# Patient Record
Sex: MALE | Race: WHITE | NOT HISPANIC OR LATINO | Employment: FULL TIME | URBAN - METROPOLITAN AREA
[De-identification: names, ages, dates, MRNs, and addresses within clinical notes are randomized per-mention and may not be internally consistent; named-entity substitution may affect disease eponyms.]

---

## 2017-01-17 RX ORDER — AMLODIPINE BESYLATE AND BENAZEPRIL HYDROCHLORIDE 5; 20 MG/1; MG/1
CAPSULE ORAL
Qty: 90 CAPSULE | Refills: 0 | Status: SHIPPED | OUTPATIENT
Start: 2017-01-17 | End: 2017-02-02 | Stop reason: SDUPTHER

## 2017-02-02 ENCOUNTER — OFFICE VISIT (OUTPATIENT)
Dept: SPORTS MEDICINE | Facility: CLINIC | Age: 52
End: 2017-02-02

## 2017-02-02 VITALS
HEIGHT: 76 IN | OXYGEN SATURATION: 98 % | BODY MASS INDEX: 37.51 KG/M2 | WEIGHT: 308 LBS | SYSTOLIC BLOOD PRESSURE: 110 MMHG | RESPIRATION RATE: 20 BRPM | HEART RATE: 77 BPM | DIASTOLIC BLOOD PRESSURE: 74 MMHG

## 2017-02-02 DIAGNOSIS — Z00.00 PREVENTATIVE HEALTH CARE: Primary | ICD-10-CM

## 2017-02-02 DIAGNOSIS — I10 ESSENTIAL HYPERTENSION: ICD-10-CM

## 2017-02-02 DIAGNOSIS — Z12.11 SCREEN FOR COLON CANCER: ICD-10-CM

## 2017-02-02 PROBLEM — I82.409 DEEP VEIN THROMBOSIS (HCC): Status: ACTIVE | Noted: 2017-02-02

## 2017-02-02 PROBLEM — M25.559 ARTHRALGIA OF HIP: Status: ACTIVE | Noted: 2017-02-02

## 2017-02-02 PROCEDURE — 99396 PREV VISIT EST AGE 40-64: CPT | Performed by: FAMILY MEDICINE

## 2017-02-02 RX ORDER — AMLODIPINE BESYLATE AND BENAZEPRIL HYDROCHLORIDE 5; 20 MG/1; MG/1
1 CAPSULE ORAL DAILY
Qty: 90 CAPSULE | Refills: 3 | Status: SHIPPED | OUTPATIENT
Start: 2017-02-02 | End: 2018-01-24 | Stop reason: SDUPTHER

## 2017-02-02 NOTE — PROGRESS NOTES
"Subjective   Kamran Gongora is a 51 y.o. male.   Chief Complaint   Patient presents with   • Annual Exam       History of Present Illness   1.  CPE  2.  No c/o with Lotrel      I have reviewed the patient's medical history in detail and updated the computerized patient record.      Review of Systems   Constitutional: Negative for activity change, appetite change, chills, diaphoresis, fatigue, fever and unexpected weight change.   HENT: Negative for congestion, ear pain, postnasal drip, rhinorrhea, sinus pressure, sneezing, sore throat and trouble swallowing.    Eyes: Negative for visual disturbance.   Respiratory: Negative for cough, chest tightness, shortness of breath and wheezing.    Cardiovascular: Negative for chest pain and palpitations.   Gastrointestinal: Negative for abdominal pain, blood in stool, nausea and vomiting.   Endocrine: Negative for cold intolerance, polydipsia, polyphagia and polyuria.   Genitourinary: Negative for dysuria, flank pain, frequency, hematuria and urgency.   Musculoskeletal: Negative for arthralgias, back pain, joint swelling and myalgias.   Skin: Negative for rash.   Allergic/Immunologic: Negative for environmental allergies.   Neurological: Negative for dizziness, syncope, weakness, numbness and headaches.   Hematological: Negative for adenopathy. Does not bruise/bleed easily.   Psychiatric/Behavioral: Negative for agitation, decreased concentration, dysphoric mood, sleep disturbance and suicidal ideas. The patient is not nervous/anxious.      Visit Vitals   • /74 (BP Location: Left arm, Patient Position: Sitting, Cuff Size: Large Adult)   • Pulse 77   • Resp 20   • Ht 75.5\" (191.8 cm)   • Wt (!) 308 lb (140 kg)   • SpO2 98%   • BMI 37.99 kg/m2       Objective   Physical Exam   Constitutional: He is oriented to person, place, and time. He appears well-developed and well-nourished.   HENT:   Head: Normocephalic and atraumatic.   Nose: Nose normal.   Mouth/Throat: " Oropharynx is clear and moist.   B/l hearing aids   Eyes: Conjunctivae and EOM are normal. Pupils are equal, round, and reactive to light.   Neck: Normal range of motion. Neck supple. No tracheal deviation present. No thyromegaly present.   Cardiovascular: Normal rate, regular rhythm and normal heart sounds.    No peripheral edema   Pulmonary/Chest: Effort normal and breath sounds normal.   Lymphadenopathy:     He has no cervical adenopathy.   Neurological: He is alert and oriented to person, place, and time.   Skin: Skin is warm, dry and intact.   Psychiatric: He has a normal mood and affect. His behavior is normal. Thought content normal.   Vitals reviewed.      Assessment/Plan   Kamran was seen today for annual exam.    Diagnoses and all orders for this visit:    Preventative health care  -     CBC & Differential  -     Comprehensive Metabolic Panel  -     Lipid Panel With / Chol / HDL Ratio  -     PSA  -     T4, Free  -     TSH  -     UA / M With / Rflx Culture([facility] ONLY)    Screen for colon cancer  -     Ambulatory Referral For Screening Colonoscopy    Essential hypertension  -     amLODIPine-benazepril (LOTREL 5-20) 5-20 MG per capsule; Take 1 capsule by mouth Daily.

## 2017-02-03 LAB
ALBUMIN SERPL-MCNC: 4.5 G/DL (ref 3.5–5.5)
ALBUMIN/GLOB SERPL: 1.7 {RATIO} (ref 1.1–2.5)
ALP SERPL-CCNC: 94 IU/L (ref 39–117)
ALT SERPL-CCNC: 19 IU/L (ref 0–44)
APPEARANCE UR: CLEAR
AST SERPL-CCNC: 20 IU/L (ref 0–40)
BACTERIA #/AREA URNS HPF: NORMAL /[HPF]
BASOPHILS # BLD AUTO: 0.1 X10E3/UL (ref 0–0.2)
BASOPHILS NFR BLD AUTO: 1 %
BILIRUB SERPL-MCNC: 0.8 MG/DL (ref 0–1.2)
BILIRUB UR QL STRIP: NEGATIVE
BUN SERPL-MCNC: 15 MG/DL (ref 6–24)
BUN/CREAT SERPL: 15 (ref 9–20)
CALCIUM SERPL-MCNC: 9.8 MG/DL (ref 8.7–10.2)
CHLORIDE SERPL-SCNC: 102 MMOL/L (ref 96–106)
CHOLEST SERPL-MCNC: 192 MG/DL (ref 100–199)
CHOLEST/HDLC SERPL: 3.4 RATIO UNITS (ref 0–5)
CO2 SERPL-SCNC: 24 MMOL/L (ref 18–29)
COLOR UR: YELLOW
CREAT SERPL-MCNC: 0.98 MG/DL (ref 0.76–1.27)
EOSINOPHIL # BLD AUTO: 0.2 X10E3/UL (ref 0–0.4)
EOSINOPHIL NFR BLD AUTO: 3 %
EPI CELLS #/AREA URNS HPF: NORMAL /HPF
ERYTHROCYTE [DISTWIDTH] IN BLOOD BY AUTOMATED COUNT: 13.3 % (ref 12.3–15.4)
GLOBULIN SER CALC-MCNC: 2.7 G/DL (ref 1.5–4.5)
GLUCOSE SERPL-MCNC: 87 MG/DL (ref 65–99)
GLUCOSE UR QL: NEGATIVE
HCT VFR BLD AUTO: 48.7 % (ref 37.5–51)
HDLC SERPL-MCNC: 56 MG/DL
HGB BLD-MCNC: 16.3 G/DL (ref 12.6–17.7)
HGB UR QL STRIP: NEGATIVE
IMM GRANULOCYTES # BLD: 0 X10E3/UL (ref 0–0.1)
IMM GRANULOCYTES NFR BLD: 0 %
KETONES UR QL STRIP: NEGATIVE
LDLC SERPL CALC-MCNC: 118 MG/DL (ref 0–99)
LEUKOCYTE ESTERASE UR QL STRIP: NEGATIVE
LYMPHOCYTES # BLD AUTO: 1.6 X10E3/UL (ref 0.7–3.1)
LYMPHOCYTES NFR BLD AUTO: 23 %
MCH RBC QN AUTO: 30.6 PG (ref 26.6–33)
MCHC RBC AUTO-ENTMCNC: 33.5 G/DL (ref 31.5–35.7)
MCV RBC AUTO: 91 FL (ref 79–97)
MICRO URNS: NORMAL
MICRO URNS: NORMAL
MONOCYTES # BLD AUTO: 0.8 X10E3/UL (ref 0.1–0.9)
MONOCYTES NFR BLD AUTO: 12 %
MUCOUS THREADS URNS QL MICRO: PRESENT
NEUTROPHILS # BLD AUTO: 4.3 X10E3/UL (ref 1.4–7)
NEUTROPHILS NFR BLD AUTO: 61 %
NITRITE UR QL STRIP: NEGATIVE
PH UR STRIP: 7 [PH] (ref 5–7.5)
PLATELET # BLD AUTO: 363 X10E3/UL (ref 150–379)
POTASSIUM SERPL-SCNC: 4.8 MMOL/L (ref 3.5–5.2)
PROT SERPL-MCNC: 7.2 G/DL (ref 6–8.5)
PROT UR QL STRIP: NEGATIVE
PSA SERPL-MCNC: 1.7 NG/ML (ref 0–4)
RBC # BLD AUTO: 5.33 X10E6/UL (ref 4.14–5.8)
RBC #/AREA URNS HPF: NORMAL /HPF
SODIUM SERPL-SCNC: 140 MMOL/L (ref 134–144)
SP GR UR: 1.01 (ref 1–1.03)
T4 FREE SERPL-MCNC: 1.03 NG/DL (ref 0.82–1.77)
TRIGL SERPL-MCNC: 92 MG/DL (ref 0–149)
TSH SERPL DL<=0.005 MIU/L-ACNC: 2.94 UIU/ML (ref 0.45–4.5)
URINALYSIS REFLEX: NORMAL
UROBILINOGEN UR STRIP-MCNC: 0.2 MG/DL (ref 0.2–1)
VLDLC SERPL CALC-MCNC: 18 MG/DL (ref 5–40)
WBC # BLD AUTO: 7 X10E3/UL (ref 3.4–10.8)
WBC #/AREA URNS HPF: NORMAL /HPF

## 2017-02-08 ENCOUNTER — TELEPHONE (OUTPATIENT)
Dept: SPORTS MEDICINE | Facility: CLINIC | Age: 52
End: 2017-02-08

## 2017-07-07 ENCOUNTER — OFFICE VISIT (OUTPATIENT)
Dept: SPORTS MEDICINE | Facility: CLINIC | Age: 52
End: 2017-07-07

## 2017-07-07 VITALS
OXYGEN SATURATION: 98 % | BODY MASS INDEX: 38.36 KG/M2 | HEART RATE: 87 BPM | SYSTOLIC BLOOD PRESSURE: 120 MMHG | HEIGHT: 76 IN | WEIGHT: 315 LBS | DIASTOLIC BLOOD PRESSURE: 76 MMHG

## 2017-07-07 DIAGNOSIS — M16.12 PRIMARY OSTEOARTHRITIS OF LEFT HIP: Primary | ICD-10-CM

## 2017-07-07 DIAGNOSIS — M70.62 TROCHANTERIC BURSITIS OF LEFT HIP: ICD-10-CM

## 2017-07-07 PROCEDURE — 20610 DRAIN/INJ JOINT/BURSA W/O US: CPT | Performed by: FAMILY MEDICINE

## 2017-07-07 PROCEDURE — 73502 X-RAY EXAM HIP UNI 2-3 VIEWS: CPT | Performed by: FAMILY MEDICINE

## 2017-07-07 PROCEDURE — 99214 OFFICE O/P EST MOD 30 MIN: CPT | Performed by: FAMILY MEDICINE

## 2017-07-07 RX ORDER — TRIAMCINOLONE ACETONIDE 40 MG/ML
40 INJECTION, SUSPENSION INTRA-ARTICULAR; INTRAMUSCULAR ONCE
Status: COMPLETED | OUTPATIENT
Start: 2017-07-07 | End: 2017-07-07

## 2017-07-07 RX ADMIN — TRIAMCINOLONE ACETONIDE 40 MG: 40 INJECTION, SUSPENSION INTRA-ARTICULAR; INTRAMUSCULAR at 16:42

## 2017-07-07 NOTE — PROGRESS NOTES
"Kamran is a 52 y.o. year old male    Chief Complaint   Patient presents with   • Left Hip - Pain       History of Present Illness  Patient has been having left hip stiffness for a number of years, has noted over the past few months that he has discomfort over the lateral and posterior hip after prolonged working hours were he is standing.  No known trauma.  Occasionally has some discomfort in the anterior hip.  No previous injuries, no locking or giving way.  No radicular symptoms.    I have reviewed the patient's medical history in detail and updated the computerized patient record.    Review of Systems   Constitutional: Negative for fever.   Musculoskeletal:        Per history of present illness   Skin: Negative for wound.   Neurological: Negative for numbness.   All other systems reviewed and are negative.      /76  Pulse 87  Ht 75.5\" (191.8 cm)  Wt (!) 317 lb (144 kg)  SpO2 98%  BMI 39.1 kg/m2     Physical Exam    Vital signs reviewed.   General: No acute distress.  Eyes: conjunctiva clear; pupils equally round and reactive  ENT: external ears and nose atraumatic; oropharynx clear  CV: no peripheral edema, 2+ distal pulses  Resp: normal respiratory effort, no use of accessory muscles  Skin: no rashes or wounds; normal turgor  Psych: mood and affect appropriate; recent and remote memory intact  Neuro: sensation to light touch intact    MSK Exam:  Left hip normal in general appearance, patient has tenderness over the superior posterior greater trochanter.  Patient has extremely limited internal rotation to only a few degrees.  Patient has only mildly limited external rotation.  Patient does have discomfort with attempt of internal rotation.  Negative logroll.  Motor 5 out of 5.    Left Hip X-Ray  Indication: Pain  AP and Frog Leg views    Findings:  No fracture  No bony lesion  Normal soft tissues  Moderate arthritic changes right femoral acetabulum, severe arthritis left FA joint.    No prior studies " "were available for comparison.    Trochanteric Bursa Injection Procedure Note    Left trochanteric bursa/gluteal tendon insertion injection was discussed with the patient in detail, including indication, risks, benefits, and alternatives. Verbal consent was given for the procedure. Injection was performed by MD.  Injection site was identified by physical examination and cleaned with Betadine and alcohol swabs. Prior to needle insertion, ethyl chloride spray was used for surface anesthesia. Sterile technique was used.  A 25-gauge, 1.5\" needle was directed to the bursa space by direct approach. Injectate was passed without difficulty. The needle was removed and a simple bandage was applied. The procedure was tolerated well without difficulty.    Injection mixture:  1% lidocaine without epinephrine: 3 mL    40 mg/mL triamcinolone acetonide: 1 mL    Diagnoses and all orders for this visit:    Primary osteoarthritis of left hip  -     XR Hip With or Without Pelvis 2 - 3 View Left  -     Ambulatory Referral to Orthopedic Surgery    Trochanteric bursitis of left hip  -     triamcinolone acetonide (KENALOG-40) injection 40 mg; Inject 1 mL into the joint 1 (One) Time.    In an attempt to alleviate his discomfort over the lateral hip today, which could be either from trochanteric bursitis or gluteus medius tendinitis, we will try injection of Kenalog 40 mg.  Postinjection ice and activity was discussed with the patient.  Patient has significant arthritis left hip, refer to orthopedics.  I have given him a copy of his x-rays to take with him to that appointment.    EMR Dragon/Transcription disclaimer:    Much of this encounter note is an electronic transcription/translation of spoken language to printed text.  The electronic translation of spoken language may permit erroneous, or at times, nonsensical words or phrases to be inadvertently transcribed.  Although I have reviewed the note for such errors some may still exist.     "

## 2017-11-03 ENCOUNTER — OFFICE VISIT (OUTPATIENT)
Dept: SPORTS MEDICINE | Facility: CLINIC | Age: 52
End: 2017-11-03

## 2017-11-03 VITALS
BODY MASS INDEX: 39.17 KG/M2 | OXYGEN SATURATION: 97 % | WEIGHT: 315 LBS | DIASTOLIC BLOOD PRESSURE: 84 MMHG | HEIGHT: 75 IN | SYSTOLIC BLOOD PRESSURE: 114 MMHG | HEART RATE: 82 BPM

## 2017-11-03 DIAGNOSIS — Z01.818 PREOPERATIVE CLEARANCE: Primary | ICD-10-CM

## 2017-11-03 DIAGNOSIS — M16.12 PRIMARY OSTEOARTHRITIS OF LEFT HIP: ICD-10-CM

## 2017-11-03 PROCEDURE — 99214 OFFICE O/P EST MOD 30 MIN: CPT | Performed by: FAMILY MEDICINE

## 2017-11-03 NOTE — PROGRESS NOTES
"Kamran is a 52 y.o. year old male    Chief Complaint   Patient presents with   • Medical Clearance       History of Present Illness   HPI   L hip replacement planned for 11/15/2017 Dr [email or web address].labs and EKG 10/25/2017. No CP, no SOA, no palpitations, syncope or near syncope. No cough, no fever or chills.  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 Treated without difficulty. No family history of clotting disorders. His labs on 10/25/2017 were normal, his EKG shows left axis deviation.    I have reviewed the patient's medical, family, and social history in detail and updated the computerized patient record.    Review of Systems   Constitutional: Negative for activity change, appetite change, chills, diaphoresis, fatigue, fever and unexpected weight change.   HENT: Negative for congestion, ear pain, postnasal drip, rhinorrhea, sinus pressure, sneezing, sore throat and trouble swallowing.    Eyes: Negative for visual disturbance.   Respiratory: Negative for cough, chest tightness, shortness of breath and wheezing.    Cardiovascular: Negative for chest pain and palpitations.   Gastrointestinal: Negative for abdominal pain, blood in stool, nausea and vomiting.   Endocrine: Negative for cold intolerance, polydipsia, polyphagia and polyuria.   Genitourinary: Negative for dysuria, flank pain, frequency, hematuria and urgency.   Musculoskeletal: Negative for arthralgias, back pain, joint swelling and myalgias.        L hip pain   Skin: Negative for rash.   Allergic/Immunologic: Negative for environmental allergies.   Neurological: Negative for dizziness, syncope, weakness, numbness and headaches.   Hematological: Negative for adenopathy. Does not bruise/bleed easily.   Psychiatric/Behavioral: Negative for agitation, decreased concentration, dysphoric mood, sleep disturbance and suicidal ideas. The patient is not nervous/anxious.        /84  Pulse 82  Ht 75\" (190.5 cm)  Wt (!) " 322 lb (146 kg)  SpO2 97%  BMI 40.25 kg/m2     Physical Exam   Constitutional: He is oriented to person, place, and time. He appears well-developed and well-nourished.   HENT:   Head: Normocephalic and atraumatic.   Eyes: Conjunctivae and EOM are normal. Pupils are equal, round, and reactive to light.   Neck: Normal range of motion. Neck supple. No thyromegaly present.   Cardiovascular: Normal rate, regular rhythm and normal heart sounds.    No peripheral edema   Pulmonary/Chest: Effort normal and breath sounds normal.   Neurological: He is alert and oriented to person, place, and time.   Skin: Skin is warm, dry and intact.   Psychiatric: He has a normal mood and affect. His behavior is normal. Thought content normal.   Vitals reviewed.       Diagnoses and all orders for this visit:    Preoperative clearance    Primary osteoarthritis of left hip    At this time I see no underlying medical issues that would preclude the patient from having his planned surgery.  It is possible that he would need a prolonged course of anti-thrombotic's after his surgery given his past history provoked DVT.    EMR Dragon/Transcription disclaimer:    Much of this encounter note is an electronic transcription/translation of spoken language to printed text.  The electronic translation of spoken language may permit erroneous, or at times, nonsensical words or phrases to be inadvertently transcribed.  Although I have reviewed the note for such errors some may still exist.

## 2018-01-24 DIAGNOSIS — I10 ESSENTIAL HYPERTENSION: ICD-10-CM

## 2018-01-24 RX ORDER — AMLODIPINE BESYLATE AND BENAZEPRIL HYDROCHLORIDE 5; 20 MG/1; MG/1
CAPSULE ORAL
Qty: 30 CAPSULE | Refills: 2 | Status: SHIPPED | OUTPATIENT
Start: 2018-01-24 | End: 2018-04-27 | Stop reason: SDUPTHER

## 2018-04-27 DIAGNOSIS — I10 ESSENTIAL HYPERTENSION: ICD-10-CM

## 2018-04-27 RX ORDER — AMLODIPINE BESYLATE AND BENAZEPRIL HYDROCHLORIDE 5; 20 MG/1; MG/1
CAPSULE ORAL
Qty: 30 CAPSULE | Refills: 1 | Status: SHIPPED | OUTPATIENT
Start: 2018-04-27 | End: 2018-07-01 | Stop reason: SDUPTHER

## 2018-07-01 DIAGNOSIS — I10 ESSENTIAL HYPERTENSION: ICD-10-CM

## 2018-07-02 RX ORDER — AMLODIPINE BESYLATE AND BENAZEPRIL HYDROCHLORIDE 5; 20 MG/1; MG/1
CAPSULE ORAL
Qty: 30 CAPSULE | Refills: 0 | Status: SHIPPED | OUTPATIENT
Start: 2018-07-02 | End: 2018-08-01 | Stop reason: SDUPTHER

## 2018-08-01 DIAGNOSIS — I10 ESSENTIAL HYPERTENSION: ICD-10-CM

## 2018-08-01 RX ORDER — AMLODIPINE BESYLATE AND BENAZEPRIL HYDROCHLORIDE 5; 20 MG/1; MG/1
CAPSULE ORAL
Qty: 30 CAPSULE | Refills: 0 | Status: SHIPPED | OUTPATIENT
Start: 2018-08-01 | End: 2018-08-31 | Stop reason: SDUPTHER

## 2018-08-31 DIAGNOSIS — I10 ESSENTIAL HYPERTENSION: ICD-10-CM

## 2018-08-31 RX ORDER — AMLODIPINE BESYLATE AND BENAZEPRIL HYDROCHLORIDE 5; 20 MG/1; MG/1
CAPSULE ORAL
Qty: 30 CAPSULE | Refills: 0 | Status: SHIPPED | OUTPATIENT
Start: 2018-08-31 | End: 2018-10-02 | Stop reason: SDUPTHER

## 2018-10-02 DIAGNOSIS — I10 ESSENTIAL HYPERTENSION: ICD-10-CM

## 2018-10-02 RX ORDER — AMLODIPINE BESYLATE AND BENAZEPRIL HYDROCHLORIDE 5; 20 MG/1; MG/1
CAPSULE ORAL
Qty: 30 CAPSULE | Refills: 0 | Status: SHIPPED | OUTPATIENT
Start: 2018-10-02 | End: 2018-11-01 | Stop reason: SDUPTHER

## 2018-11-01 DIAGNOSIS — I10 ESSENTIAL HYPERTENSION: ICD-10-CM

## 2018-11-01 RX ORDER — AMLODIPINE BESYLATE AND BENAZEPRIL HYDROCHLORIDE 5; 20 MG/1; MG/1
CAPSULE ORAL
Qty: 30 CAPSULE | Refills: 0 | Status: SHIPPED | OUTPATIENT
Start: 2018-11-01 | End: 2018-12-04 | Stop reason: SDUPTHER

## 2018-11-29 DIAGNOSIS — I10 ESSENTIAL HYPERTENSION: ICD-10-CM

## 2018-11-29 RX ORDER — AMLODIPINE BESYLATE AND BENAZEPRIL HYDROCHLORIDE 5; 20 MG/1; MG/1
CAPSULE ORAL
Qty: 30 CAPSULE | Refills: 0 | OUTPATIENT
Start: 2018-11-29

## 2018-12-04 ENCOUNTER — OFFICE VISIT (OUTPATIENT)
Dept: SPORTS MEDICINE | Facility: CLINIC | Age: 53
End: 2018-12-04

## 2018-12-04 VITALS
TEMPERATURE: 97.8 F | HEIGHT: 75 IN | WEIGHT: 315 LBS | DIASTOLIC BLOOD PRESSURE: 80 MMHG | HEART RATE: 91 BPM | SYSTOLIC BLOOD PRESSURE: 120 MMHG | BODY MASS INDEX: 39.17 KG/M2 | OXYGEN SATURATION: 98 %

## 2018-12-04 DIAGNOSIS — Z12.5 SCREENING FOR PROSTATE CANCER: ICD-10-CM

## 2018-12-04 DIAGNOSIS — Z12.11 SCREEN FOR COLON CANCER: ICD-10-CM

## 2018-12-04 DIAGNOSIS — I10 ESSENTIAL HYPERTENSION: ICD-10-CM

## 2018-12-04 DIAGNOSIS — Z00.00 PREVENTATIVE HEALTH CARE: Primary | ICD-10-CM

## 2018-12-04 PROBLEM — Z96.642 S/P HIP REPLACEMENT, LEFT: Status: ACTIVE | Noted: 2018-12-04

## 2018-12-04 PROBLEM — M16.12 ARTHRITIS OF LEFT HIP: Status: ACTIVE | Noted: 2017-09-27

## 2018-12-04 PROCEDURE — 99396 PREV VISIT EST AGE 40-64: CPT | Performed by: FAMILY MEDICINE

## 2018-12-04 RX ORDER — AMLODIPINE BESYLATE AND BENAZEPRIL HYDROCHLORIDE 5; 20 MG/1; MG/1
1 CAPSULE ORAL DAILY
Qty: 90 CAPSULE | Refills: 3 | Status: SHIPPED | OUTPATIENT
Start: 2018-12-04 | End: 2019-11-15 | Stop reason: SDUPTHER

## 2018-12-04 NOTE — PROGRESS NOTES
"Kamran is a 53 y.o. year old male    Chief Complaint   Patient presents with   • Med Refill     Patient also here for complete physical.  History of Present Illness   HPI   1.  Patient overall is doing well, he had previous left hip arthroplasty last year and is doing well.  2.  Follow-up hypertension, no chest pain, shortness of breath, syncope or near-syncope.  No complaints with current medication.  I have reviewed the patient's medical, family, and social history in detail and updated the computerized patient record.    Review of Systems   Constitutional: Negative.    HENT: Negative.    Respiratory: Negative.    Cardiovascular: Negative.    Gastrointestinal: Negative.    Endocrine: Negative.    Genitourinary: Negative.    Neurological: Negative.        /80   Pulse 91   Temp 97.8 °F (36.6 °C)   Ht 190.5 cm (75\")   Wt (!) 155 kg (342 lb)   SpO2 98%   BMI 42.75 kg/m²      Physical Exam   Constitutional: He is oriented to person, place, and time. He appears well-developed and well-nourished.   HENT:   Head: Normocephalic and atraumatic.   Bilateral hearing aids in place   Eyes: Conjunctivae and EOM are normal. Pupils are equal, round, and reactive to light.   Neck: Normal range of motion. Neck supple. No thyromegaly present.   Cardiovascular: Normal rate, regular rhythm and normal heart sounds.   No peripheral edema   Pulmonary/Chest: Effort normal and breath sounds normal.   Lymphadenopathy:     He has no cervical adenopathy.   Neurological: He is alert and oriented to person, place, and time.   Skin: Skin is warm, dry and intact.   Psychiatric: He has a normal mood and affect. His behavior is normal. Thought content normal.   Vitals reviewed.       Diagnoses and all orders for this visit:    Preventative health care  -     CBC & Differential  -     Comprehensive Metabolic Panel  -     Lipid Panel With / Chol / HDL Ratio  -     T4, Free  -     TSH  -     UA / M With / Rflx Culture([facility] ONLY) - Urine, " Clean Catch  -     Hemoglobin A1c  -     PSA Screen    Essential hypertension  -     CBC & Differential  -     Comprehensive Metabolic Panel  -     Lipid Panel With / Chol / HDL Ratio  -     T4, Free  -     TSH  -     UA / M With / Rflx Culture([facility] ONLY) - Urine, Clean Catch  -     Hemoglobin A1c  -     PSA Screen  -     amLODIPine-benazepril (LOTREL 5-20) 5-20 MG per capsule; Take 1 capsule by mouth Daily.    Screening for prostate cancer  -     CBC & Differential  -     Comprehensive Metabolic Panel  -     Lipid Panel With / Chol / HDL Ratio  -     T4, Free  -     TSH  -     UA / M With / Rflx Culture([facility] ONLY) - Urine, Clean Catch  -     Hemoglobin A1c  -     PSA Screen    Screen for colon cancer  -     Ambulatory Referral to Gastroenterology       Encourage healthy diet and exercise.  Encourage patient to stay up to date on screening examinations as indicated based on age and risk factors.        EMR Dragon/Transcription disclaimer:    Much of this encounter note is an electronic transcription/translation of spoken language to printed text.  The electronic translation of spoken language may permit erroneous, or at times, nonsensical words or phrases to be inadvertently transcribed.  Although I have reviewed the note for such errors some may still exist.

## 2018-12-05 LAB
ALBUMIN SERPL-MCNC: 4.3 G/DL (ref 3.5–5.2)
ALBUMIN/GLOB SERPL: 1.5 G/DL
ALP SERPL-CCNC: 97 U/L (ref 39–117)
ALT SERPL-CCNC: 20 U/L (ref 1–41)
APPEARANCE UR: CLEAR
AST SERPL-CCNC: 21 U/L (ref 1–40)
BACTERIA #/AREA URNS HPF: NORMAL /HPF
BASOPHILS # BLD AUTO: 0.05 10*3/MM3 (ref 0–0.2)
BASOPHILS NFR BLD AUTO: 0.4 % (ref 0–1.5)
BILIRUB SERPL-MCNC: 0.6 MG/DL (ref 0.1–1.2)
BILIRUB UR QL STRIP: NEGATIVE
BUN SERPL-MCNC: 15 MG/DL (ref 6–20)
BUN/CREAT SERPL: 14.2 (ref 7–25)
CALCIUM SERPL-MCNC: 9.9 MG/DL (ref 8.6–10.5)
CHLORIDE SERPL-SCNC: 100 MMOL/L (ref 98–107)
CHOLEST SERPL-MCNC: 191 MG/DL (ref 0–200)
CHOLEST/HDLC SERPL: 3.9 {RATIO}
CO2 SERPL-SCNC: 23.3 MMOL/L (ref 22–29)
COLOR UR: YELLOW
CREAT SERPL-MCNC: 1.06 MG/DL (ref 0.76–1.27)
EOSINOPHIL # BLD AUTO: 0.05 10*3/MM3 (ref 0–0.7)
EOSINOPHIL NFR BLD AUTO: 0.4 % (ref 0.3–6.2)
EPI CELLS #/AREA URNS HPF: NORMAL /HPF
ERYTHROCYTE [DISTWIDTH] IN BLOOD BY AUTOMATED COUNT: 13.4 % (ref 11.5–14.5)
GLOBULIN SER CALC-MCNC: 2.8 GM/DL
GLUCOSE SERPL-MCNC: 91 MG/DL (ref 65–99)
GLUCOSE UR QL: NEGATIVE
HBA1C MFR BLD: 5.55 % (ref 4.8–5.6)
HCT VFR BLD AUTO: 48.5 % (ref 40.4–52.2)
HDLC SERPL-MCNC: 49 MG/DL (ref 40–60)
HGB BLD-MCNC: 16.1 G/DL (ref 13.7–17.6)
HGB UR QL STRIP: NEGATIVE
IMM GRANULOCYTES # BLD: 0.03 10*3/MM3 (ref 0–0.03)
IMM GRANULOCYTES NFR BLD: 0.3 % (ref 0–0.5)
KETONES UR QL STRIP: NEGATIVE
LDLC SERPL CALC-MCNC: 120 MG/DL (ref 0–100)
LEUKOCYTE ESTERASE UR QL STRIP: NEGATIVE
LYMPHOCYTES # BLD AUTO: 2.1 10*3/MM3 (ref 0.9–4.8)
LYMPHOCYTES NFR BLD AUTO: 18 % (ref 19.6–45.3)
MCH RBC QN AUTO: 30.6 PG (ref 27–32.7)
MCHC RBC AUTO-ENTMCNC: 33.2 G/DL (ref 32.6–36.4)
MCV RBC AUTO: 92.2 FL (ref 79.8–96.2)
MICRO URNS: NORMAL
MICRO URNS: NORMAL
MONOCYTES # BLD AUTO: 0.81 10*3/MM3 (ref 0.2–1.2)
MONOCYTES NFR BLD AUTO: 7 % (ref 5–12)
MUCOUS THREADS URNS QL MICRO: PRESENT /HPF
NEUTROPHILS # BLD AUTO: 8.64 10*3/MM3 (ref 1.9–8.1)
NEUTROPHILS NFR BLD AUTO: 74.2 % (ref 42.7–76)
NITRITE UR QL STRIP: NEGATIVE
PH UR STRIP: 6.5 [PH] (ref 5–7.5)
PLATELET # BLD AUTO: 317 10*3/MM3 (ref 140–500)
POTASSIUM SERPL-SCNC: 4.7 MMOL/L (ref 3.5–5.2)
PROT SERPL-MCNC: 7.1 G/DL (ref 6–8.5)
PROT UR QL STRIP: NEGATIVE
PSA SERPL-MCNC: 1.77 NG/ML (ref 0–4)
RBC # BLD AUTO: 5.26 10*6/MM3 (ref 4.6–6)
RBC #/AREA URNS HPF: NORMAL /HPF
SODIUM SERPL-SCNC: 138 MMOL/L (ref 136–145)
SP GR UR: 1.01 (ref 1–1.03)
T4 FREE SERPL-MCNC: 0.99 NG/DL (ref 0.93–1.7)
TRIGL SERPL-MCNC: 110 MG/DL (ref 0–150)
TSH SERPL DL<=0.005 MIU/L-ACNC: 2.23 MIU/ML (ref 0.27–4.2)
URINALYSIS REFLEX: NORMAL
UROBILINOGEN UR STRIP-MCNC: 0.2 MG/DL (ref 0.2–1)
VLDLC SERPL CALC-MCNC: 22 MG/DL (ref 5–40)
WBC # BLD AUTO: 11.65 10*3/MM3 (ref 4.5–10.7)
WBC #/AREA URNS HPF: NORMAL /HPF

## 2019-11-15 DIAGNOSIS — I10 ESSENTIAL HYPERTENSION: ICD-10-CM

## 2019-11-19 RX ORDER — AMLODIPINE BESYLATE AND BENAZEPRIL HYDROCHLORIDE 5; 20 MG/1; MG/1
1 CAPSULE ORAL DAILY
Qty: 90 CAPSULE | Refills: 3 | Status: SHIPPED | OUTPATIENT
Start: 2019-11-19 | End: 2020-03-04 | Stop reason: SDUPTHER

## 2020-03-04 ENCOUNTER — OFFICE VISIT (OUTPATIENT)
Dept: SPORTS MEDICINE | Facility: CLINIC | Age: 55
End: 2020-03-04

## 2020-03-04 VITALS
BODY MASS INDEX: 39.17 KG/M2 | HEART RATE: 100 BPM | TEMPERATURE: 97.9 F | SYSTOLIC BLOOD PRESSURE: 124 MMHG | DIASTOLIC BLOOD PRESSURE: 82 MMHG | HEIGHT: 75 IN | OXYGEN SATURATION: 98 % | WEIGHT: 315 LBS

## 2020-03-04 DIAGNOSIS — I10 ESSENTIAL HYPERTENSION: ICD-10-CM

## 2020-03-04 DIAGNOSIS — Z00.00 PREVENTATIVE HEALTH CARE: Primary | ICD-10-CM

## 2020-03-04 LAB
ALBUMIN SERPL-MCNC: 4.6 G/DL (ref 3.5–5.2)
ALBUMIN/GLOB SERPL: 1.7 G/DL
ALP SERPL-CCNC: 94 U/L (ref 39–117)
ALT SERPL-CCNC: 19 U/L (ref 1–41)
AST SERPL-CCNC: 16 U/L (ref 1–40)
BASOPHILS # BLD AUTO: 0.07 10*3/MM3 (ref 0–0.2)
BASOPHILS NFR BLD AUTO: 1.1 % (ref 0–1.5)
BILIRUB SERPL-MCNC: 0.6 MG/DL (ref 0.2–1.2)
BUN SERPL-MCNC: 13 MG/DL (ref 6–20)
BUN/CREAT SERPL: 12.1 (ref 7–25)
CALCIUM SERPL-MCNC: 9.8 MG/DL (ref 8.6–10.5)
CHLORIDE SERPL-SCNC: 99 MMOL/L (ref 98–107)
CHOLEST SERPL-MCNC: 153 MG/DL (ref 0–200)
CHOLEST/HDLC SERPL: 3.56 {RATIO}
CO2 SERPL-SCNC: 24.4 MMOL/L (ref 22–29)
CREAT SERPL-MCNC: 1.07 MG/DL (ref 0.76–1.27)
EOSINOPHIL # BLD AUTO: 0.17 10*3/MM3 (ref 0–0.4)
EOSINOPHIL NFR BLD AUTO: 2.7 % (ref 0.3–6.2)
ERYTHROCYTE [DISTWIDTH] IN BLOOD BY AUTOMATED COUNT: 12.5 % (ref 12.3–15.4)
GLOBULIN SER CALC-MCNC: 2.7 GM/DL
GLUCOSE SERPL-MCNC: 100 MG/DL (ref 65–99)
HBA1C MFR BLD: 5.7 % (ref 4.8–5.6)
HCT VFR BLD AUTO: 48.6 % (ref 37.5–51)
HDLC SERPL-MCNC: 43 MG/DL (ref 40–60)
HGB BLD-MCNC: 16.6 G/DL (ref 13–17.7)
IMM GRANULOCYTES # BLD AUTO: 0.01 10*3/MM3 (ref 0–0.05)
IMM GRANULOCYTES NFR BLD AUTO: 0.2 % (ref 0–0.5)
LDLC SERPL CALC-MCNC: 90 MG/DL (ref 0–100)
LYMPHOCYTES # BLD AUTO: 1.52 10*3/MM3 (ref 0.7–3.1)
LYMPHOCYTES NFR BLD AUTO: 23.9 % (ref 19.6–45.3)
MCH RBC QN AUTO: 30.9 PG (ref 26.6–33)
MCHC RBC AUTO-ENTMCNC: 34.2 G/DL (ref 31.5–35.7)
MCV RBC AUTO: 90.3 FL (ref 79–97)
MONOCYTES # BLD AUTO: 0.63 10*3/MM3 (ref 0.1–0.9)
MONOCYTES NFR BLD AUTO: 9.9 % (ref 5–12)
NEUTROPHILS # BLD AUTO: 3.96 10*3/MM3 (ref 1.7–7)
NEUTROPHILS NFR BLD AUTO: 62.2 % (ref 42.7–76)
NRBC BLD AUTO-RTO: 0 /100 WBC (ref 0–0.2)
PLATELET # BLD AUTO: 343 10*3/MM3 (ref 140–450)
POTASSIUM SERPL-SCNC: 4.9 MMOL/L (ref 3.5–5.2)
PROT SERPL-MCNC: 7.3 G/DL (ref 6–8.5)
PSA SERPL-MCNC: 1.91 NG/ML (ref 0–4)
RBC # BLD AUTO: 5.38 10*6/MM3 (ref 4.14–5.8)
SODIUM SERPL-SCNC: 135 MMOL/L (ref 136–145)
T4 FREE SERPL-MCNC: 1.16 NG/DL (ref 0.93–1.7)
TRIGL SERPL-MCNC: 101 MG/DL (ref 0–150)
TSH SERPL DL<=0.005 MIU/L-ACNC: 2.95 UIU/ML (ref 0.27–4.2)
UNABLE TO VOID: NORMAL
VLDLC SERPL CALC-MCNC: 20.2 MG/DL
WBC # BLD AUTO: 6.36 10*3/MM3 (ref 3.4–10.8)

## 2020-03-04 PROCEDURE — 99396 PREV VISIT EST AGE 40-64: CPT | Performed by: FAMILY MEDICINE

## 2020-03-04 RX ORDER — AMLODIPINE BESYLATE AND BENAZEPRIL HYDROCHLORIDE 5; 20 MG/1; MG/1
1 CAPSULE ORAL DAILY
Qty: 90 CAPSULE | Refills: 3 | Status: SHIPPED | OUTPATIENT
Start: 2020-03-04 | End: 2021-02-22

## 2020-03-04 NOTE — PROGRESS NOTES
Kamran Gongora is here today for an annual physical exam.     Weight down with effort.     PHQ-2 Depression Screening  Little interest or pleasure in doing things? 0   Feeling down, depressed, or hopeless? 0   PHQ-2 Total Score 0        I have reviewed the patient's medical, family, and social history in detail and updated the computerized patient record.    Screening history:  Colonoscopy - due, he has the paperwork at home and will schedule this  Prostate - 2018  Metabolic - last year    Health Maintenance   Topic Date Due   • TDAP/TD VACCINES (1 - Tdap) 03/29/1977   • PNEUMOCOCCAL VACCINE (19-64 MEDIUM RISK) (1 of 1 - PPSV23) 03/29/1985   • ZOSTER VACCINE (1 of 2) 03/29/2016   • COLONOSCOPY  07/07/2017   • INFLUENZA VACCINE  08/01/2019   • ANNUAL PHYSICAL  12/05/2019       Review of Systems   Constitutional: Negative for activity change, appetite change, chills, diaphoresis, fatigue, fever and unexpected weight change.   HENT: Negative for congestion, ear pain, postnasal drip, rhinorrhea, sinus pressure, sneezing, sore throat and trouble swallowing.    Eyes: Negative for visual disturbance.   Respiratory: Negative for cough, chest tightness, shortness of breath and wheezing.    Cardiovascular: Negative for chest pain and palpitations.   Gastrointestinal: Negative for abdominal pain, blood in stool, nausea and vomiting.   Endocrine: Negative for cold intolerance, polydipsia, polyphagia and polyuria.   Genitourinary: Negative for dysuria, flank pain, frequency, hematuria and urgency.   Musculoskeletal: Negative for arthralgias, back pain, joint swelling and myalgias.   Skin: Negative for rash.   Allergic/Immunologic: Negative for environmental allergies.   Neurological: Negative for dizziness, syncope, weakness, numbness and headaches.   Hematological: Negative for adenopathy. Does not bruise/bleed easily.   Psychiatric/Behavioral: Negative for agitation, decreased concentration, dysphoric mood, sleep  "disturbance and suicidal ideas. The patient is not nervous/anxious.        /82   Pulse 100   Temp 97.9 °F (36.6 °C)   Ht 190.5 cm (75\")   Wt (!) 146 kg (322 lb)   SpO2 98%   BMI 40.25 kg/m²      Physical Exam    Vital signs reviewed.  General appearance: No acute distress  Eyes: conjunctiva clear without erythema; pupils equally round and reactive  ENT: external ears and nose normal; hearing normal, oropharynx clear  Neck: supple; no thyromegaly  CV: normal rate and rhythm; no peripheral edema  Respiratory: normal respiratory effort; lungs clear to auscultation bilaterally  MSK: normal gait and station; no focal joint deformity or swelling  Skin: no rash or wounds; normal turgor  Neuro: cranial nerves 2-12 grossly intact; normal sensation to light touch  Psych: mood and affect normal; recent and remote memory intact    No visits with results within 2 Week(s) from this visit.   Latest known visit with results is:   Office Visit on 12/04/2018   Component Date Value Ref Range Status   • WBC 12/04/2018 11.65* 4.50 - 10.70 10*3/mm3 Final   • RBC 12/04/2018 5.26  4.60 - 6.00 10*6/mm3 Final   • Hemoglobin 12/04/2018 16.1  13.7 - 17.6 g/dL Final   • Hematocrit 12/04/2018 48.5  40.4 - 52.2 % Final   • MCV 12/04/2018 92.2  79.8 - 96.2 fL Final   • MCH 12/04/2018 30.6  27.0 - 32.7 pg Final   • MCHC 12/04/2018 33.2  32.6 - 36.4 g/dL Final   • RDW 12/04/2018 13.4  11.5 - 14.5 % Final   • Platelets 12/04/2018 317  140 - 500 10*3/mm3 Final   • Neutrophil Rel % 12/04/2018 74.2  42.7 - 76.0 % Final   • Lymphocyte Rel % 12/04/2018 18.0* 19.6 - 45.3 % Final   • Monocyte Rel % 12/04/2018 7.0  5.0 - 12.0 % Final   • Eosinophil Rel % 12/04/2018 0.4  0.3 - 6.2 % Final   • Basophil Rel % 12/04/2018 0.4  0.0 - 1.5 % Final   • Neutrophils Absolute 12/04/2018 8.64* 1.90 - 8.10 10*3/mm3 Final   • Lymphocytes Absolute 12/04/2018 2.10  0.90 - 4.80 10*3/mm3 Final   • Monocytes Absolute 12/04/2018 0.81  0.20 - 1.20 10*3/mm3 Final   • " Eosinophils Absolute 12/04/2018 0.05  0.00 - 0.70 10*3/mm3 Final   • Basophils Absolute 12/04/2018 0.05  0.00 - 0.20 10*3/mm3 Final   • Immature Granulocyte Rel % 12/04/2018 0.3  0.0 - 0.5 % Final   • Immature Grans Absolute 12/04/2018 0.03  0.00 - 0.03 10*3/mm3 Final   • Glucose 12/04/2018 91  65 - 99 mg/dL Final   • BUN 12/04/2018 15  6 - 20 mg/dL Final   • Creatinine 12/04/2018 1.06  0.76 - 1.27 mg/dL Final   • eGFR Non  Am 12/04/2018 73  >60 mL/min/1.73 Final   • eGFR African Am 12/04/2018 89  >60 mL/min/1.73 Final   • BUN/Creatinine Ratio 12/04/2018 14.2  7.0 - 25.0 Final   • Sodium 12/04/2018 138  136 - 145 mmol/L Final   • Potassium 12/04/2018 4.7  3.5 - 5.2 mmol/L Final   • Chloride 12/04/2018 100  98 - 107 mmol/L Final   • Total CO2 12/04/2018 23.3  22.0 - 29.0 mmol/L Final   • Calcium 12/04/2018 9.9  8.6 - 10.5 mg/dL Final   • Total Protein 12/04/2018 7.1  6.0 - 8.5 g/dL Final   • Albumin 12/04/2018 4.30  3.50 - 5.20 g/dL Final   • Globulin 12/04/2018 2.8  gm/dL Final   • A/G Ratio 12/04/2018 1.5  g/dL Final   • Total Bilirubin 12/04/2018 0.6  0.1 - 1.2 mg/dL Final   • Alkaline Phosphatase 12/04/2018 97  39 - 117 U/L Final   • AST (SGOT) 12/04/2018 21  1 - 40 U/L Final   • ALT (SGPT) 12/04/2018 20  1 - 41 U/L Final   • Total Cholesterol 12/04/2018 191  0 - 200 mg/dL Final   • Triglycerides 12/04/2018 110  0 - 150 mg/dL Final   • HDL Cholesterol 12/04/2018 49  40 - 60 mg/dL Final   • VLDL Cholesterol 12/04/2018 22  5 - 40 mg/dL Final   • LDL Cholesterol  12/04/2018 120* 0 - 100 mg/dL Final   • Chol/HDL Ratio 12/04/2018 3.90   Final   • Free T4 12/04/2018 0.99  0.93 - 1.70 ng/dL Final   • TSH 12/04/2018 2.230  0.270 - 4.200 mIU/mL Final   • Specific Gravity, UA 12/04/2018 1.011  1.005 - 1.030 Final   • pH, UA 12/04/2018 6.5  5.0 - 7.5 Final   • Color, UA 12/04/2018 Yellow  Yellow Final   • Appearance, UA 12/04/2018 Clear  Clear Final   • Leukocytes, UA 12/04/2018 Negative  Negative Final   • Protein  12/04/2018 Negative  Negative/Trace Final   • Glucose, UA 12/04/2018 Negative  Negative Final   • Ketones 12/04/2018 Negative  Negative Final   • Blood, UA 12/04/2018 Negative  Negative Final   • Bilirubin, UA 12/04/2018 Negative  Negative Final   • Urobilinogen, UA 12/04/2018 0.2  0.2 - 1.0 mg/dL Final   • Nitrite, UA 12/04/2018 Negative  Negative Final   • Microscopic Examination 12/04/2018 Comment   Final    Microscopic follows if indicated.   • Microscopic Examination 12/04/2018 See below:   Final    Microscopic was indicated and was performed.   • Urinalysis Reflex 12/04/2018 Comment   Final    This specimen will not reflex to a Urine Culture.   • Hemoglobin A1C 12/04/2018 5.55  4.80 - 5.60 % Final    Comment: Hemoglobin A1C Ranges:  Increased Risk for Diabetes  5.7% to 6.4%  Diabetes                     >= 6.5%  Diabetic Goal                < 7.0%     • PSA 12/04/2018 1.770  0.000 - 4.000 ng/mL Final   • WBC, UA 12/04/2018 0-5  0 - 5 /hpf Final   • RBC, UA 12/04/2018 None seen  0 - 2 /hpf Final   • Epithelial Cells (non renal) 12/04/2018 0-10  0 - 10 /hpf Final   • Mucus, UA 12/04/2018 Present  Not Estab. /hpf Final   • Bacteria, UA 12/04/2018 None seen  None seen/Few /hpf Final          Current Outpatient Medications:   •  amLODIPine-benazepril (LOTREL 5-20) 5-20 MG per capsule, Take 1 capsule by mouth Daily., Disp: 90 capsule, Rfl: 3  •  ciclopirox (LOPROX) 0.77 % cream, , Disp: , Rfl:     Kamran was seen today for annual exam.    Diagnoses and all orders for this visit:    Preventative health care  -     Hemoglobin A1c  -     CBC & Differential  -     Comprehensive Metabolic Panel  -     Lipid Panel With / Chol / HDL Ratio  -     TSH  -     T4, Free  -     PSA Screen  -     Urinalysis With Microscopic - Urine, Clean Catch    Essential hypertension  -     amLODIPine-benazepril (LOTREL 5-20) 5-20 MG per capsule; Take 1 capsule by mouth Daily.        Encourage healthy diet and exercise.  Encourage patient to  stay up to date on screening examinations as indicated based on age and risk factors.    EMR Dragon/Transcription disclaimer:    Much of this encounter note is an electronic transcription/translation of spoken language to printed text.  The electronic translation of spoken language may permit erroneous, or at times, nonsensical words or phrases to be inadvertently transcribed.  Although I have reviewed the note for such errors some may still exist.

## 2021-02-20 DIAGNOSIS — I10 ESSENTIAL HYPERTENSION: ICD-10-CM

## 2021-02-22 RX ORDER — AMLODIPINE BESYLATE AND BENAZEPRIL HYDROCHLORIDE 5; 20 MG/1; MG/1
CAPSULE ORAL
Qty: 90 CAPSULE | Refills: 3 | Status: SHIPPED | OUTPATIENT
Start: 2021-02-22 | End: 2022-03-07 | Stop reason: SDUPTHER

## 2021-03-05 ENCOUNTER — OFFICE VISIT (OUTPATIENT)
Dept: SPORTS MEDICINE | Facility: CLINIC | Age: 56
End: 2021-03-05

## 2021-03-05 VITALS
WEIGHT: 279 LBS | OXYGEN SATURATION: 99 % | DIASTOLIC BLOOD PRESSURE: 82 MMHG | BODY MASS INDEX: 34.69 KG/M2 | TEMPERATURE: 97.9 F | HEIGHT: 75 IN | RESPIRATION RATE: 14 BRPM | HEART RATE: 85 BPM | SYSTOLIC BLOOD PRESSURE: 113 MMHG

## 2021-03-05 DIAGNOSIS — Z12.11 SCREEN FOR COLON CANCER: ICD-10-CM

## 2021-03-05 DIAGNOSIS — I10 ESSENTIAL HYPERTENSION: Chronic | ICD-10-CM

## 2021-03-05 DIAGNOSIS — Z00.00 PREVENTATIVE HEALTH CARE: Primary | ICD-10-CM

## 2021-03-05 DIAGNOSIS — Z11.59 NEED FOR HEPATITIS C SCREENING TEST: ICD-10-CM

## 2021-03-05 DIAGNOSIS — Z12.5 SCREENING FOR PROSTATE CANCER: ICD-10-CM

## 2021-03-05 PROBLEM — Z96.642 S/P HIP REPLACEMENT, LEFT: Chronic | Status: ACTIVE | Noted: 2018-12-04

## 2021-03-05 PROCEDURE — 99396 PREV VISIT EST AGE 40-64: CPT | Performed by: FAMILY MEDICINE

## 2021-03-05 NOTE — PROGRESS NOTES
"Kamran Gongora is here today for an annual physical exam.     Eating a healthy diet. Exercising routinely. Weight down with effort.    PHQ-2 Depression Screening  Little interest or pleasure in doing things? 0   Feeling down, depressed, or hopeless? 0   PHQ-2 Total Score 0        I have reviewed the patient's medical, family, and social history in detail and updated the computerized patient record.    Screening history:  Colonoscopy - due  Prostate - due  Metabolic - last year    Health Maintenance   Topic Date Due   • COLONOSCOPY  1966   • Pneumococcal Vaccine 0-64 (1 of 1 - PPSV23) 03/29/1972   • TDAP/TD VACCINES (1 - Tdap) 03/29/1985   • ZOSTER VACCINE (1 of 2) 03/29/2016   • HEPATITIS C SCREENING  07/07/2017   • INFLUENZA VACCINE  08/01/2020   • ANNUAL PHYSICAL  03/05/2021   • MENINGOCOCCAL VACCINE  Aged Out       Review of Systems   Constitutional: Negative.    HENT: Negative.    Respiratory: Negative.    Cardiovascular: Negative.    Endocrine: Negative.    Genitourinary: Negative.    Neurological: Negative.    Psychiatric/Behavioral: Negative.        /82 (BP Location: Left arm, Patient Position: Sitting, Cuff Size: Large Adult)   Pulse 85   Temp 97.9 °F (36.6 °C) (Oral)   Resp 14   Ht 190.5 cm (75\")   Wt 127 kg (279 lb)   SpO2 99%   BMI 34.87 kg/m²      Physical Exam    Vital signs reviewed.  General appearance: No acute distress  Eyes: conjunctiva clear without erythema; pupils equally round and reactive  ENT: external ears normal; hearing normal  Neck: supple; no thyromegaly  CV: normal rate and rhythm; no peripheral edema  Respiratory: normal respiratory effort; lungs clear to auscultation bilaterally  MSK: normal gait and station; no focal joint deformity or swelling  Skin: no rash or wounds; normal turgor  Neuro: cranial nerves 2-12 grossly intact; normal sensation to light touch  Psych: mood and affect normal; recent and remote memory intact    No visits with results within 2 " Week(s) from this visit.   Latest known visit with results is:   Office Visit on 03/04/2020   Component Date Value Ref Range Status   • Hemoglobin A1C 03/04/2020 5.70* 4.80 - 5.60 % Final    Comment: Hemoglobin A1C Ranges:  Increased Risk for Diabetes  5.7% to 6.4%  Diabetes                     >= 6.5%  Diabetic Goal                < 7.0%     • WBC 03/04/2020 6.36  3.40 - 10.80 10*3/mm3 Final   • RBC 03/04/2020 5.38  4.14 - 5.80 10*6/mm3 Final   • Hemoglobin 03/04/2020 16.6  13.0 - 17.7 g/dL Final   • Hematocrit 03/04/2020 48.6  37.5 - 51.0 % Final   • MCV 03/04/2020 90.3  79.0 - 97.0 fL Final   • MCH 03/04/2020 30.9  26.6 - 33.0 pg Final   • MCHC 03/04/2020 34.2  31.5 - 35.7 g/dL Final   • RDW 03/04/2020 12.5  12.3 - 15.4 % Final   • Platelets 03/04/2020 343  140 - 450 10*3/mm3 Final   • Neutrophil Rel % 03/04/2020 62.2  42.7 - 76.0 % Final   • Lymphocyte Rel % 03/04/2020 23.9  19.6 - 45.3 % Final   • Monocyte Rel % 03/04/2020 9.9  5.0 - 12.0 % Final   • Eosinophil Rel % 03/04/2020 2.7  0.3 - 6.2 % Final   • Basophil Rel % 03/04/2020 1.1  0.0 - 1.5 % Final   • Neutrophils Absolute 03/04/2020 3.96  1.70 - 7.00 10*3/mm3 Final   • Lymphocytes Absolute 03/04/2020 1.52  0.70 - 3.10 10*3/mm3 Final   • Monocytes Absolute 03/04/2020 0.63  0.10 - 0.90 10*3/mm3 Final   • Eosinophils Absolute 03/04/2020 0.17  0.00 - 0.40 10*3/mm3 Final   • Basophils Absolute 03/04/2020 0.07  0.00 - 0.20 10*3/mm3 Final   • Immature Granulocyte Rel % 03/04/2020 0.2  0.0 - 0.5 % Final   • Immature Grans Absolute 03/04/2020 0.01  0.00 - 0.05 10*3/mm3 Final   • nRBC 03/04/2020 0.0  0.0 - 0.2 /100 WBC Final   • Glucose 03/04/2020 100* 65 - 99 mg/dL Final   • BUN 03/04/2020 13  6 - 20 mg/dL Final   • Creatinine 03/04/2020 1.07  0.76 - 1.27 mg/dL Final   • eGFR Non African Am 03/04/2020 72  >60 mL/min/1.73 Final   • eGFR African Am 03/04/2020 88  >60 mL/min/1.73 Final   • BUN/Creatinine Ratio 03/04/2020 12.1  7.0 - 25.0 Final   • Sodium 03/04/2020  135* 136 - 145 mmol/L Final   • Potassium 03/04/2020 4.9  3.5 - 5.2 mmol/L Final   • Chloride 03/04/2020 99  98 - 107 mmol/L Final   • Total CO2 03/04/2020 24.4  22.0 - 29.0 mmol/L Final   • Calcium 03/04/2020 9.8  8.6 - 10.5 mg/dL Final   • Total Protein 03/04/2020 7.3  6.0 - 8.5 g/dL Final   • Albumin 03/04/2020 4.60  3.50 - 5.20 g/dL Final   • Globulin 03/04/2020 2.7  gm/dL Final   • A/G Ratio 03/04/2020 1.7  g/dL Final   • Total Bilirubin 03/04/2020 0.6  0.2 - 1.2 mg/dL Final   • Alkaline Phosphatase 03/04/2020 94  39 - 117 U/L Final   • AST (SGOT) 03/04/2020 16  1 - 40 U/L Final   • ALT (SGPT) 03/04/2020 19  1 - 41 U/L Final   • Total Cholesterol 03/04/2020 153  0 - 200 mg/dL Final   • Triglycerides 03/04/2020 101  0 - 150 mg/dL Final   • HDL Cholesterol 03/04/2020 43  40 - 60 mg/dL Final   • VLDL Cholesterol 03/04/2020 20.2  mg/dL Final   • LDL Cholesterol  03/04/2020 90  0 - 100 mg/dL Final   • Chol/HDL Ratio 03/04/2020 3.56   Final   • TSH 03/04/2020 2.950  0.270 - 4.200 uIU/mL Final   • Free T4 03/04/2020 1.16  0.93 - 1.70 ng/dL Final    Results may be falsely increased if patient taking Biotin.   • PSA 03/04/2020 1.910  0.000 - 4.000 ng/mL Final    Results may be falsely decreased if patient taking Biotin.   • Unable to Void 03/04/2020 Comment   Final    Comment: The patient was not able to render a urine sample and has been  instructed to return for a urine collection at their earliest  convenience.  The urine testing that you have requested has  been deleted from this report.  When the patient returns and  provides a urine specimen, the urine testing will be performed  and separately reported.            Current Outpatient Medications:   •  amLODIPine-benazepril (LOTREL 5-20) 5-20 MG per capsule, TAKE ONE CAPSULE BY MOUTH DAILY, Disp: 90 capsule, Rfl: 3  •  ciclopirox (LOPROX) 0.77 % cream, , Disp: , Rfl:     Diagnoses and all orders for this visit:    1. Preventative health care (Primary)  -      Hemoglobin A1c  -     CBC & Differential  -     Comprehensive Metabolic Panel  -     Lipid Panel With / Chol / HDL Ratio  -     TSH  -     T4, Free  -     PSA Screen  -     Urinalysis With Microscopic - Urine, Clean Catch  -     Hepatitis C Antibody    2. Essential hypertension    3. Screening for prostate cancer  -     PSA Screen    4. Screen for colon cancer  -     Ambulatory Referral to Gastroenterology    5. Need for hepatitis C screening test  -     Hepatitis C Antibody          Shingrix d/w patient.      Encourage healthy diet and exercise.  Encourage patient to stay up to date on screening examinations as indicated based on age and risk factors.    EMR Dragon/Transcription disclaimer:    Much of this encounter note is an electronic transcription/translation of spoken language to printed text.  The electronic translation of spoken language may permit erroneous, or at times, nonsensical words or phrases to be inadvertently transcribed.  Although I have reviewed the note for such errors some may still exist.

## 2021-03-06 LAB
ALBUMIN SERPL-MCNC: 4.2 G/DL (ref 3.5–5.2)
ALBUMIN/GLOB SERPL: 1.5 G/DL
ALP SERPL-CCNC: 79 U/L (ref 39–117)
ALT SERPL-CCNC: 21 U/L (ref 1–41)
APPEARANCE UR: CLEAR
AST SERPL-CCNC: 23 U/L (ref 1–40)
BACTERIA #/AREA URNS HPF: ABNORMAL /HPF
BASOPHILS # BLD AUTO: 0.07 10*3/MM3 (ref 0–0.2)
BASOPHILS NFR BLD AUTO: 1.2 % (ref 0–1.5)
BILIRUB SERPL-MCNC: 0.6 MG/DL (ref 0–1.2)
BILIRUB UR QL STRIP: NEGATIVE
BUN SERPL-MCNC: 18 MG/DL (ref 6–20)
BUN/CREAT SERPL: 18 (ref 7–25)
CALCIUM SERPL-MCNC: 9.5 MG/DL (ref 8.6–10.5)
CASTS URNS MICRO: ABNORMAL
CHLORIDE SERPL-SCNC: 99 MMOL/L (ref 98–107)
CHOLEST SERPL-MCNC: 164 MG/DL (ref 0–200)
CHOLEST/HDLC SERPL: 3.49 {RATIO}
CO2 SERPL-SCNC: 28.4 MMOL/L (ref 22–29)
COLOR UR: YELLOW
CREAT SERPL-MCNC: 1 MG/DL (ref 0.76–1.27)
EOSINOPHIL # BLD AUTO: 0.13 10*3/MM3 (ref 0–0.4)
EOSINOPHIL NFR BLD AUTO: 2.1 % (ref 0.3–6.2)
EPI CELLS #/AREA URNS HPF: ABNORMAL /HPF
ERYTHROCYTE [DISTWIDTH] IN BLOOD BY AUTOMATED COUNT: 12.3 % (ref 12.3–15.4)
GLOBULIN SER CALC-MCNC: 2.8 GM/DL
GLUCOSE SERPL-MCNC: 92 MG/DL (ref 65–99)
GLUCOSE UR QL: NEGATIVE
HBA1C MFR BLD: 5.2 % (ref 4.8–5.6)
HCT VFR BLD AUTO: 49.9 % (ref 37.5–51)
HCV AB S/CO SERPL IA: <0.1 S/CO RATIO (ref 0–0.9)
HDLC SERPL-MCNC: 47 MG/DL (ref 40–60)
HGB BLD-MCNC: 17.1 G/DL (ref 13–17.7)
HGB UR QL STRIP: NEGATIVE
IMM GRANULOCYTES # BLD AUTO: 0.01 10*3/MM3 (ref 0–0.05)
IMM GRANULOCYTES NFR BLD AUTO: 0.2 % (ref 0–0.5)
KETONES UR QL STRIP: NEGATIVE
LDLC SERPL CALC-MCNC: 102 MG/DL (ref 0–100)
LEUKOCYTE ESTERASE UR QL STRIP: NEGATIVE
LYMPHOCYTES # BLD AUTO: 1.61 10*3/MM3 (ref 0.7–3.1)
LYMPHOCYTES NFR BLD AUTO: 26.6 % (ref 19.6–45.3)
MCH RBC QN AUTO: 31.1 PG (ref 26.6–33)
MCHC RBC AUTO-ENTMCNC: 34.3 G/DL (ref 31.5–35.7)
MCV RBC AUTO: 90.7 FL (ref 79–97)
MONOCYTES # BLD AUTO: 0.55 10*3/MM3 (ref 0.1–0.9)
MONOCYTES NFR BLD AUTO: 9.1 % (ref 5–12)
NEUTROPHILS # BLD AUTO: 3.69 10*3/MM3 (ref 1.7–7)
NEUTROPHILS NFR BLD AUTO: 60.8 % (ref 42.7–76)
NITRITE UR QL STRIP: NEGATIVE
NRBC BLD AUTO-RTO: 0 /100 WBC (ref 0–0.2)
PH UR STRIP: 7 [PH] (ref 5–8)
PLATELET # BLD AUTO: 279 10*3/MM3 (ref 140–450)
POTASSIUM SERPL-SCNC: 4.7 MMOL/L (ref 3.5–5.2)
PROT SERPL-MCNC: 7 G/DL (ref 6–8.5)
PROT UR QL STRIP: NEGATIVE
PSA SERPL-MCNC: 2.15 NG/ML (ref 0–4)
RBC # BLD AUTO: 5.5 10*6/MM3 (ref 4.14–5.8)
RBC #/AREA URNS HPF: ABNORMAL /HPF
SODIUM SERPL-SCNC: 136 MMOL/L (ref 136–145)
SP GR UR: 1.02 (ref 1–1.03)
T4 FREE SERPL-MCNC: 1.12 NG/DL (ref 0.93–1.7)
TRIGL SERPL-MCNC: 80 MG/DL (ref 0–150)
TSH SERPL DL<=0.005 MIU/L-ACNC: 2.57 UIU/ML (ref 0.27–4.2)
UROBILINOGEN UR STRIP-MCNC: NORMAL MG/DL
VLDLC SERPL CALC-MCNC: 15 MG/DL (ref 5–40)
WBC # BLD AUTO: 6.06 10*3/MM3 (ref 3.4–10.8)
WBC #/AREA URNS HPF: ABNORMAL /HPF

## 2021-03-09 NOTE — PROGRESS NOTES
Attempted to contact patient via phone with no answer. Left a voicemail stating labs were great and no changes were suggested. Voiced to return call to office with any questions or concerns.   Yohan

## 2021-03-30 ENCOUNTER — BULK ORDERING (OUTPATIENT)
Dept: CASE MANAGEMENT | Facility: OTHER | Age: 56
End: 2021-03-30

## 2021-03-30 DIAGNOSIS — Z23 IMMUNIZATION DUE: ICD-10-CM

## 2022-03-07 ENCOUNTER — OFFICE VISIT (OUTPATIENT)
Dept: SPORTS MEDICINE | Facility: CLINIC | Age: 57
End: 2022-03-07

## 2022-03-07 VITALS
HEIGHT: 75 IN | WEIGHT: 306 LBS | OXYGEN SATURATION: 96 % | TEMPERATURE: 98.6 F | SYSTOLIC BLOOD PRESSURE: 128 MMHG | BODY MASS INDEX: 38.05 KG/M2 | HEART RATE: 78 BPM | RESPIRATION RATE: 16 BRPM | DIASTOLIC BLOOD PRESSURE: 84 MMHG

## 2022-03-07 DIAGNOSIS — Z12.5 SCREENING FOR PROSTATE CANCER: ICD-10-CM

## 2022-03-07 DIAGNOSIS — I10 ESSENTIAL HYPERTENSION: Chronic | ICD-10-CM

## 2022-03-07 DIAGNOSIS — Z00.00 PREVENTATIVE HEALTH CARE: Primary | ICD-10-CM

## 2022-03-07 PROCEDURE — 99396 PREV VISIT EST AGE 40-64: CPT | Performed by: FAMILY MEDICINE

## 2022-03-07 RX ORDER — AMLODIPINE BESYLATE AND BENAZEPRIL HYDROCHLORIDE 5; 20 MG/1; MG/1
1 CAPSULE ORAL DAILY
Qty: 90 CAPSULE | Refills: 3 | Status: SHIPPED | OUTPATIENT
Start: 2022-03-07 | End: 2023-04-05 | Stop reason: SDUPTHER

## 2022-03-07 NOTE — PROGRESS NOTES
"Kamran Gongora is here today for an annual physical exam.     No specific c/o today.     PHQ-2 Depression Screening  Little interest or pleasure in doing things?  0   Feeling down, depressed, or hopeless?  0   PHQ-2 Total Score  0        I have reviewed the patient's medical, family, and social history in detail and updated the computerized patient record.    Screening history:  Colonoscopy - pending, he is working on getting this done  Prostate - due  Metabolic - last year    Health Maintenance   Topic Date Due   • COLORECTAL CANCER SCREENING  Never done   • Pneumococcal Vaccine 0-64 (1 of 2 - PPSV23) Never done   • TDAP/TD VACCINES (1 - Tdap) Never done   • ZOSTER VACCINE (1 of 2) Never done   • INFLUENZA VACCINE  Never done   • COVID-19 Vaccine (3 - Booster) 09/22/2021   • ANNUAL PHYSICAL  03/06/2022   • HEPATITIS C SCREENING  Completed       Review of Systems    /84 (BP Location: Right arm, Patient Position: Sitting, Cuff Size: Adult)   Pulse 78   Temp 98.6 °F (37 °C)   Resp 16   Ht 190.5 cm (75\")   Wt (!) 139 kg (306 lb)   SpO2 96%   BMI 38.25 kg/m²      Physical Exam    Vital signs reviewed.  General appearance: No acute distress  Eyes: conjunctiva clear without erythema; pupils equally round and reactive  ENT: external ears normal; hearing normal  Neck: supple; no thyromegaly  CV: normal rate and rhythm; no peripheral edema  Respiratory: normal respiratory effort; lungs clear to auscultation bilaterally  MSK: normal gait and station; no focal joint deformity or swelling  Skin: no rash or wounds; normal turgor  Neuro: cranial nerves 2-12 grossly intact; normal sensation to light touch  Psych: mood and affect normal; recent and remote memory intact    No visits with results within 2 Week(s) from this visit.   Latest known visit with results is:   Office Visit on 03/05/2021   Component Date Value Ref Range Status   • Hemoglobin A1C 03/05/2021 5.20  4.80 - 5.60 % Final    Comment: Hemoglobin A1C " Ranges:  Increased Risk for Diabetes  5.7% to 6.4%  Diabetes                     >= 6.5%  Diabetic Goal                < 7.0%     • WBC 03/05/2021 6.06  3.40 - 10.80 10*3/mm3 Final   • RBC 03/05/2021 5.50  4.14 - 5.80 10*6/mm3 Final   • Hemoglobin 03/05/2021 17.1  13.0 - 17.7 g/dL Final   • Hematocrit 03/05/2021 49.9  37.5 - 51.0 % Final   • MCV 03/05/2021 90.7  79.0 - 97.0 fL Final   • MCH 03/05/2021 31.1  26.6 - 33.0 pg Final   • MCHC 03/05/2021 34.3  31.5 - 35.7 g/dL Final   • RDW 03/05/2021 12.3  12.3 - 15.4 % Final   • Platelets 03/05/2021 279  140 - 450 10*3/mm3 Final   • Neutrophil Rel % 03/05/2021 60.8  42.7 - 76.0 % Final   • Lymphocyte Rel % 03/05/2021 26.6  19.6 - 45.3 % Final   • Monocyte Rel % 03/05/2021 9.1  5.0 - 12.0 % Final   • Eosinophil Rel % 03/05/2021 2.1  0.3 - 6.2 % Final   • Basophil Rel % 03/05/2021 1.2  0.0 - 1.5 % Final   • Neutrophils Absolute 03/05/2021 3.69  1.70 - 7.00 10*3/mm3 Final   • Lymphocytes Absolute 03/05/2021 1.61  0.70 - 3.10 10*3/mm3 Final   • Monocytes Absolute 03/05/2021 0.55  0.10 - 0.90 10*3/mm3 Final   • Eosinophils Absolute 03/05/2021 0.13  0.00 - 0.40 10*3/mm3 Final   • Basophils Absolute 03/05/2021 0.07  0.00 - 0.20 10*3/mm3 Final   • Immature Granulocyte Rel % 03/05/2021 0.2  0.0 - 0.5 % Final   • Immature Grans Absolute 03/05/2021 0.01  0.00 - 0.05 10*3/mm3 Final   • nRBC 03/05/2021 0.0  0.0 - 0.2 /100 WBC Final   • Glucose 03/05/2021 92  65 - 99 mg/dL Final   • BUN 03/05/2021 18  6 - 20 mg/dL Final   • Creatinine 03/05/2021 1.00  0.76 - 1.27 mg/dL Final   • eGFR Non African Am 03/05/2021 78  >60 mL/min/1.73 Final    Comment: GFR Normal >60  Chronic Kidney Disease <60  Kidney Failure <15     • eGFR  Am 03/05/2021 94  >60 mL/min/1.73 Final   • BUN/Creatinine Ratio 03/05/2021 18.0  7.0 - 25.0 Final   • Sodium 03/05/2021 136  136 - 145 mmol/L Final   • Potassium 03/05/2021 4.7  3.5 - 5.2 mmol/L Final   • Chloride 03/05/2021 99  98 - 107 mmol/L Final   • Total  CO2 03/05/2021 28.4  22.0 - 29.0 mmol/L Final   • Calcium 03/05/2021 9.5  8.6 - 10.5 mg/dL Final   • Total Protein 03/05/2021 7.0  6.0 - 8.5 g/dL Final   • Albumin 03/05/2021 4.20  3.50 - 5.20 g/dL Final   • Globulin 03/05/2021 2.8  gm/dL Final   • A/G Ratio 03/05/2021 1.5  g/dL Final   • Total Bilirubin 03/05/2021 0.6  0.0 - 1.2 mg/dL Final   • Alkaline Phosphatase 03/05/2021 79  39 - 117 U/L Final   • AST (SGOT) 03/05/2021 23  1 - 40 U/L Final   • ALT (SGPT) 03/05/2021 21  1 - 41 U/L Final   • Total Cholesterol 03/05/2021 164  0 - 200 mg/dL Final    Comment: Cholesterol Reference Ranges  (U.S. Department of Health and Human Services ATP III  Classifications)  Desirable          <200 mg/dL  Borderline High    200-239 mg/dL  High Risk          >240 mg/dL  Triglyceride Reference Ranges  (U.S. Department of Health and Human Services ATP III  Classifications)  Normal           <150 mg/dL  Borderline High  150-199 mg/dL  High             200-499 mg/dL  Very High        >500 mg/dL  HDL Reference Ranges  (U.S. Department of Health and Human Services ATP III  Classifcations)  Low     <40 mg/dl (major risk factor for CHD)  High    >60 mg/dl ('negative' risk factor for CHD)  LDL Reference Ranges  (U.S. Department of Health and Human Services ATP III  Classifcations)  Optimal          <100 mg/dL  Near Optimal     100-129 mg/dL  Borderline High  130-159 mg/dL  High             160-189 mg/dL  Very High        >189 mg/dL     • Triglycerides 03/05/2021 80  0 - 150 mg/dL Final   • HDL Cholesterol 03/05/2021 47  40 - 60 mg/dL Final   • VLDL Cholesterol Jamey 03/05/2021 15  5 - 40 mg/dL Final   • LDL Chol Calc (NIH) 03/05/2021 102 (A) 0 - 100 mg/dL Final   • Chol/HDL Ratio 03/05/2021 3.49   Final   • TSH 03/05/2021 2.570  0.270 - 4.200 uIU/mL Final   • Free T4 03/05/2021 1.12  0.93 - 1.70 ng/dL Final    Results may be falsely increased if patient taking Biotin.   • PSA 03/05/2021 2.150  0.000 - 4.000 ng/mL Final   • Specific Gravity,  UA 03/05/2021 1.018  1.005 - 1.030 Final   • pH, UA 03/05/2021 7.0  5.0 - 8.0 Final   • Color, UA 03/05/2021 Yellow   Final    REFERENCE RANGE: Yellow, Straw   • Appearance, UA 03/05/2021 Clear  Clear Final   • Leukocytes, UA 03/05/2021 Negative  Negative Final   • Protein 03/05/2021 Negative  Negative Final   • Glucose, UA 03/05/2021 Negative  Negative Final   • Ketones 03/05/2021 Negative  Negative Final   • Blood, UA 03/05/2021 Negative  Negative Final   • Bilirubin, UA 03/05/2021 Negative  Negative Final   • Urobilinogen, UA 03/05/2021 Comment   Final    Comment: 0.2 E.U./dL  REFERENCE RANGE: 0.2 - 1.0 E.U./dL     • Nitrite, UA 03/05/2021 Negative  Negative Final   • Hep C Virus Ab 03/05/2021 <0.1  0.0 - 0.9 s/co ratio Final    Comment:                                   Negative:     < 0.8                               Indeterminate: 0.8 - 0.9                                    Positive:     > 0.9   The CDC recommends that a positive HCV antibody result   be followed up with a HCV Nucleic Acid Amplification   test ([id number]).     • WBC, UA 03/05/2021 0-2  /HPF Final    REFERENCE RANGE: None Seen, 0-2   • RBC, UA 03/05/2021 3-5 (A) /HPF Final    REFERENCE RANGE: None Seen, 0-2   • Epithelial Cells (non renal) 03/05/2021 0-2  /HPF Final    REFERENCE RANGE: None Seen, 0-2   • Cast Type 03/05/2021 Comment   Final    HYALINE CASTS, UA            0-2              /LPF       None Seen  N   • Bacteria, UA 03/05/2021 Comment  None Seen /HPF Final    None Seen          Current Outpatient Medications:   •  amLODIPine-benazepril (LOTREL 5-20) 5-20 MG per capsule, Take 1 capsule by mouth Daily., Disp: 90 capsule, Rfl: 3    Diagnoses and all orders for this visit:    1. Preventative health care (Primary)  -     CBC & Differential  -     Comprehensive Metabolic Panel  -     Lipid Panel With / Chol / HDL Ratio  -     TSH  -     T4, Free  -     Hemoglobin A1c  -     UA / M With / Rflx Culture([facility] ONLY) - Urine, Clean Catch  -      PSA Screen    2. Essential hypertension  -     CBC & Differential  -     Comprehensive Metabolic Panel  -     Lipid Panel With / Chol / HDL Ratio  -     TSH  -     T4, Free  -     Hemoglobin A1c  -     UA / M With / Rflx Culture([facility] ONLY) - Urine, Clean Catch  -     amLODIPine-benazepril (LOTREL 5-20) 5-20 MG per capsule; Take 1 capsule by mouth Daily.  Dispense: 90 capsule; Refill: 3    3. Screening for prostate cancer  -     CBC & Differential  -     Comprehensive Metabolic Panel  -     Lipid Panel With / Chol / HDL Ratio  -     TSH  -     T4, Free  -     Hemoglobin A1c  -     UA / M With / Rflx Culture([facility] ONLY) - Urine, Clean Catch  -     PSA Screen      Discussed colonoscopy, and vaccines with patient. He is in the process of getting those arranged   Encourage healthy diet and exercise.  Encourage patient to stay up to date on screening examinations as indicated based on age and risk factors.    EMR Dragon/Transcription disclaimer:    Much of this encounter note is an electronic transcription/translation of spoken language to printed text.  The electronic translation of spoken language may permit erroneous, or at times, nonsensical words or phrases to be inadvertently transcribed.  Although I have reviewed the note for such errors some may still exist.

## 2022-03-08 LAB
ALBUMIN SERPL-MCNC: 4.3 G/DL (ref 3.8–4.9)
ALBUMIN/GLOB SERPL: 1.7 {RATIO} (ref 1.2–2.2)
ALP SERPL-CCNC: 86 IU/L (ref 44–121)
ALT SERPL-CCNC: 14 IU/L (ref 0–44)
APPEARANCE UR: CLEAR
AST SERPL-CCNC: 18 IU/L (ref 0–40)
BACTERIA #/AREA URNS HPF: NORMAL /[HPF]
BASOPHILS # BLD AUTO: 0.1 X10E3/UL (ref 0–0.2)
BASOPHILS NFR BLD AUTO: 1 %
BILIRUB SERPL-MCNC: 0.3 MG/DL (ref 0–1.2)
BILIRUB UR QL STRIP: NEGATIVE
BUN SERPL-MCNC: 20 MG/DL (ref 6–24)
BUN/CREAT SERPL: 20 (ref 9–20)
CALCIUM SERPL-MCNC: 9.4 MG/DL (ref 8.7–10.2)
CASTS URNS QL MICRO: NORMAL /LPF
CHLORIDE SERPL-SCNC: 104 MMOL/L (ref 96–106)
CHOLEST SERPL-MCNC: 175 MG/DL (ref 100–199)
CHOLEST/HDLC SERPL: 3.6 RATIO (ref 0–5)
CO2 SERPL-SCNC: 21 MMOL/L (ref 20–29)
COLOR UR: YELLOW
CREAT SERPL-MCNC: 1.01 MG/DL (ref 0.76–1.27)
EGFR GENE MUT ANL BLD/T: 88 ML/MIN/1.73
EOSINOPHIL # BLD AUTO: 0.1 X10E3/UL (ref 0–0.4)
EOSINOPHIL NFR BLD AUTO: 2 %
EPI CELLS #/AREA URNS HPF: NORMAL /HPF (ref 0–10)
ERYTHROCYTE [DISTWIDTH] IN BLOOD BY AUTOMATED COUNT: 12.3 % (ref 11.6–15.4)
GLOBULIN SER CALC-MCNC: 2.6 G/DL (ref 1.5–4.5)
GLUCOSE SERPL-MCNC: 96 MG/DL (ref 65–99)
GLUCOSE UR QL STRIP: NEGATIVE
HBA1C MFR BLD: 5.5 % (ref 4.8–5.6)
HCT VFR BLD AUTO: 48.7 % (ref 37.5–51)
HDLC SERPL-MCNC: 48 MG/DL
HGB BLD-MCNC: 16.7 G/DL (ref 13–17.7)
HGB UR QL STRIP: NEGATIVE
IMM GRANULOCYTES # BLD AUTO: 0 X10E3/UL (ref 0–0.1)
IMM GRANULOCYTES NFR BLD AUTO: 0 %
KETONES UR QL STRIP: NEGATIVE
LDLC SERPL CALC-MCNC: 106 MG/DL (ref 0–99)
LEUKOCYTE ESTERASE UR QL STRIP: NEGATIVE
LYMPHOCYTES # BLD AUTO: 1.5 X10E3/UL (ref 0.7–3.1)
LYMPHOCYTES NFR BLD AUTO: 25 %
MCH RBC QN AUTO: 31.4 PG (ref 26.6–33)
MCHC RBC AUTO-ENTMCNC: 34.3 G/DL (ref 31.5–35.7)
MCV RBC AUTO: 92 FL (ref 79–97)
MICRO URNS: NORMAL
MICRO URNS: NORMAL
MONOCYTES # BLD AUTO: 0.6 X10E3/UL (ref 0.1–0.9)
MONOCYTES NFR BLD AUTO: 11 %
NEUTROPHILS # BLD AUTO: 3.6 X10E3/UL (ref 1.4–7)
NEUTROPHILS NFR BLD AUTO: 61 %
NITRITE UR QL STRIP: NEGATIVE
PH UR STRIP: 6 [PH] (ref 5–7.5)
PLATELET # BLD AUTO: 307 X10E3/UL (ref 150–450)
POTASSIUM SERPL-SCNC: 4.7 MMOL/L (ref 3.5–5.2)
PROT SERPL-MCNC: 6.9 G/DL (ref 6–8.5)
PROT UR QL STRIP: NEGATIVE
PSA SERPL-MCNC: 2.4 NG/ML (ref 0–4)
RBC # BLD AUTO: 5.32 X10E6/UL (ref 4.14–5.8)
RBC #/AREA URNS HPF: NORMAL /HPF (ref 0–2)
SODIUM SERPL-SCNC: 140 MMOL/L (ref 134–144)
SP GR UR STRIP: 1.02 (ref 1–1.03)
T4 FREE SERPL-MCNC: 1.11 NG/DL (ref 0.82–1.77)
TRIGL SERPL-MCNC: 116 MG/DL (ref 0–149)
TSH SERPL DL<=0.005 MIU/L-ACNC: 2.54 UIU/ML (ref 0.45–4.5)
URINALYSIS REFLEX: NORMAL
UROBILINOGEN UR STRIP-MCNC: 0.2 MG/DL (ref 0.2–1)
VLDLC SERPL CALC-MCNC: 21 MG/DL (ref 5–40)
WBC # BLD AUTO: 6 X10E3/UL (ref 3.4–10.8)
WBC #/AREA URNS HPF: NORMAL /HPF (ref 0–5)

## 2023-04-05 ENCOUNTER — OFFICE VISIT (OUTPATIENT)
Dept: FAMILY MEDICINE CLINIC | Facility: CLINIC | Age: 58
End: 2023-04-05
Payer: COMMERCIAL

## 2023-04-05 VITALS
SYSTOLIC BLOOD PRESSURE: 133 MMHG | HEART RATE: 90 BPM | BODY MASS INDEX: 39.17 KG/M2 | DIASTOLIC BLOOD PRESSURE: 71 MMHG | OXYGEN SATURATION: 97 % | HEIGHT: 75 IN | TEMPERATURE: 96.6 F | WEIGHT: 315 LBS

## 2023-04-05 DIAGNOSIS — Z12.5 SCREENING FOR PROSTATE CANCER: ICD-10-CM

## 2023-04-05 DIAGNOSIS — Z00.00 ROUTINE GENERAL MEDICAL EXAMINATION AT A HEALTH CARE FACILITY: Primary | ICD-10-CM

## 2023-04-05 DIAGNOSIS — Z12.11 SCREEN FOR COLON CANCER: ICD-10-CM

## 2023-04-05 DIAGNOSIS — I10 ESSENTIAL HYPERTENSION: Chronic | ICD-10-CM

## 2023-04-05 RX ORDER — AMLODIPINE BESYLATE AND BENAZEPRIL HYDROCHLORIDE 5; 20 MG/1; MG/1
1 CAPSULE ORAL DAILY
Qty: 90 CAPSULE | Refills: 1 | Status: SHIPPED | OUTPATIENT
Start: 2023-04-05

## 2023-04-05 NOTE — PROGRESS NOTES
"Chief Complaint  Med Refill, Establish Care, and Hypertension    Subjective        Sreedhar Gongora presents to [facility]  History of Present Illness       Mr. SREEDHAR GONGORA is a 58-year-old male who presents today to establish care for hypertension.     He does not take much medication. He took Tylenol when he had his hip surgery.    He has not seen Dr. Sher since .    In general, his health is good. His health goals for the year to overall diet and increase exercise.  He is working on his weight. He is watching what he eats, walking, and doing exercises.    He is .  Has 1 grown child.  Works for [employer]    He has chronic hearing loss.  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  He wears hearing aids. He is hoping to get new ones eventually. He has an appointment with an office of vocational rehab on 2023.    He has not had a colonoscopy yet.  He denies a family history of colon cancer. His father  from [place].     He had his COVID-19 booster in . He has not had his shingles vaccine.    He denies a family history of prostate cancer. He denies urinary problems hematuria.    He denies shortness of breath or coughing. He denies unusual fatigue or intolerance for exercise. He denies chest pain or heart palpitations, orthopnea.    He wears a strip on his nose because his wife told him he snores. He has never been tested for sleep apnea. His wife has not told him he stops breathing.  Does not want sleep study at this time.    He denies nausea, vomiting, diarrhea, constipation, acid reflux, or blood in his stool. He denies ear pain, dysphagia. He denies headaches or dizziness.    He goes to the dentist regularly.    He denies skin problems. He denies muscle aches or pains that are new or different.    Objective   Vital Signs:  /71   Pulse 90   Temp 96.6 °F (35.9 °C) (Temporal)   Ht 190.5 cm (75\")   Wt (!) 152 kg (334 lb)   SpO2 97%   BMI 41.75 kg/m² " "  Estimated body mass index is 41.75 kg/m² as calculated from the following:    Height as of this encounter: 190.5 cm (75\").    Weight as of this encounter: 152 kg (334 lb).       Class 3 Severe Obesity (BMI >=40). Obesity-related health conditions include the following: hypertension. Obesity is newly identified. BMI is is above average; BMI management plan is completed. We discussed portion control and increasing exercise.      Physical Exam  Vitals and nursing note reviewed.   Constitutional:       General: He is not in acute distress.     Appearance: He is well-developed. He is not ill-appearing.   HENT:      Head: Normocephalic and atraumatic.      Right Ear: Tympanic membrane, ear canal and external ear normal.      Left Ear: Tympanic membrane, ear canal and external ear normal.      Ears:      Comments: Hearing assist device bilaterally  Very little hearing noted without devices     Mouth/Throat:      Mouth: Mucous membranes are moist.      Pharynx: Uvula midline. No posterior oropharyngeal erythema.   Eyes:      General: No scleral icterus.        Right eye: No discharge.         Left eye: No discharge.      Conjunctiva/sclera: Conjunctivae normal.   Neck:      Thyroid: No thyromegaly.   Cardiovascular:      Rate and Rhythm: Normal rate and regular rhythm.      Heart sounds: Normal heart sounds.   Pulmonary:      Effort: Pulmonary effort is normal.      Breath sounds: Normal breath sounds.   Abdominal:      General: Bowel sounds are normal. There is no distension.      Palpations: Abdomen is soft.      Tenderness: There is no abdominal tenderness.   Musculoskeletal:         General: No deformity.      Cervical back: Neck supple.      Comments: Gait smooth and steady   Lymphadenopathy:      Cervical: No cervical adenopathy.   Skin:     General: Skin is warm and dry.   Neurological:      General: No focal deficit present.      Mental Status: He is alert and oriented to person, place, and time.   Psychiatric:    "      Mood and Affect: Mood normal.         Behavior: Behavior normal.         Thought Content: Thought content normal.      Comments: Very pleasant and conversant        Result Review :                   Assessment and Plan   Diagnoses and all orders for this visit:    1. Routine general medical examination at a health care facility (Primary)  Comments:  The patient will have lab work performed at his convenience.  The patient will have a colonoscopy performed.  Orders:  -     CBC & Differential  -     Comprehensive Metabolic Panel  -     Hemoglobin A1c  -     Lipid Panel With LDL / HDL Ratio    2. Essential hypertension  Comments:  The patient's blood pressure is well controlled at this time. He will continue his current medication regimen.  Orders:  -     amLODIPine-benazepril (LOTREL 5-20) 5-20 MG per capsule; Take 1 capsule by mouth Daily.  Dispense: 90 capsule; Refill: 1  -     CBC & Differential  -     Comprehensive Metabolic Panel    3. Screening for prostate cancer  -     PSA DIAGNOSTIC ONLY    4. Screen for colon cancer  -     Ambulatory Referral For Screening Colonoscopy    Appropriate health maintenance and prevention topics specific for this patient were discussed today.  Additionally, health goals, and health concerns addressed as appropriate.  Pt was encouraged to stay up to date on recommended screenings and vaccines based on USPSTF guidelines.     Did discuss having sleep study which patient will consider.         Follow Up   Return in about 6 months (around 10/5/2023) for Recheck.  Patient was given instructions and counseling regarding his condition or for health maintenance advice. Please see specific information pulled into the AVS if appropriate.     Transcribed from ambient dictation for JULIO C Shetty by Mickie Myers.  04/05/23   15:36 EDT    Patient or patient representative verbalized consent to the visit recording.  I have personally performed the services described in this document  as transcribed by the above individual, and it is both accurate and complete.

## 2023-04-06 ENCOUNTER — PATIENT ROUNDING (BHMG ONLY) (OUTPATIENT)
Dept: FAMILY MEDICINE CLINIC | Facility: CLINIC | Age: 58
End: 2023-04-06
Payer: COMMERCIAL

## 2023-04-06 NOTE — PROGRESS NOTES
A My-Chart message has been sent to the patient [place] [facility] [place]

## 2023-04-15 LAB
ALBUMIN SERPL-MCNC: 4.5 G/DL (ref 3.5–5.2)
ALBUMIN/GLOB SERPL: 1.6 G/DL
ALP SERPL-CCNC: 109 U/L (ref 39–117)
ALT SERPL-CCNC: 20 U/L (ref 1–41)
AST SERPL-CCNC: 22 U/L (ref 1–40)
BASOPHILS # BLD AUTO: 0.08 10*3/MM3 (ref 0–0.2)
BASOPHILS NFR BLD AUTO: 0.8 % (ref 0–1.5)
BILIRUB SERPL-MCNC: 0.8 MG/DL (ref 0–1.2)
BUN SERPL-MCNC: 16 MG/DL (ref 6–20)
BUN/CREAT SERPL: 16.8 (ref 7–25)
CALCIUM SERPL-MCNC: 10 MG/DL (ref 8.6–10.5)
CHLORIDE SERPL-SCNC: 101 MMOL/L (ref 98–107)
CHOLEST SERPL-MCNC: 192 MG/DL (ref 0–200)
CO2 SERPL-SCNC: 23.3 MMOL/L (ref 22–29)
CREAT SERPL-MCNC: 0.95 MG/DL (ref 0.76–1.27)
EGFRCR SERPLBLD CKD-EPI 2021: 93.4 ML/MIN/1.73
EOSINOPHIL # BLD AUTO: 0.18 10*3/MM3 (ref 0–0.4)
EOSINOPHIL NFR BLD AUTO: 1.9 % (ref 0.3–6.2)
ERYTHROCYTE [DISTWIDTH] IN BLOOD BY AUTOMATED COUNT: 12.3 % (ref 12.3–15.4)
GLOBULIN SER CALC-MCNC: 2.8 GM/DL
GLUCOSE SERPL-MCNC: 98 MG/DL (ref 65–99)
HBA1C MFR BLD: 5.8 % (ref 4.8–5.6)
HCT VFR BLD AUTO: 47.5 % (ref 37.5–51)
HDLC SERPL-MCNC: 47 MG/DL (ref 40–60)
HGB BLD-MCNC: 16.4 G/DL (ref 13–17.7)
IMM GRANULOCYTES # BLD AUTO: 0.03 10*3/MM3 (ref 0–0.05)
IMM GRANULOCYTES NFR BLD AUTO: 0.3 % (ref 0–0.5)
LDLC SERPL CALC-MCNC: 124 MG/DL (ref 0–100)
LDLC/HDLC SERPL: 2.59 {RATIO}
LYMPHOCYTES # BLD AUTO: 1.91 10*3/MM3 (ref 0.7–3.1)
LYMPHOCYTES NFR BLD AUTO: 19.8 % (ref 19.6–45.3)
MCH RBC QN AUTO: 31.2 PG (ref 26.6–33)
MCHC RBC AUTO-ENTMCNC: 34.5 G/DL (ref 31.5–35.7)
MCV RBC AUTO: 90.5 FL (ref 79–97)
MONOCYTES # BLD AUTO: 0.98 10*3/MM3 (ref 0.1–0.9)
MONOCYTES NFR BLD AUTO: 10.1 % (ref 5–12)
NEUTROPHILS # BLD AUTO: 6.49 10*3/MM3 (ref 1.7–7)
NEUTROPHILS NFR BLD AUTO: 67.1 % (ref 42.7–76)
NRBC BLD AUTO-RTO: 0 /100 WBC (ref 0–0.2)
PLATELET # BLD AUTO: 308 10*3/MM3 (ref 140–450)
POTASSIUM SERPL-SCNC: 4.6 MMOL/L (ref 3.5–5.2)
PROT SERPL-MCNC: 7.3 G/DL (ref 6–8.5)
PSA SERPL-MCNC: 2.47 NG/ML (ref 0–4)
RBC # BLD AUTO: 5.25 10*6/MM3 (ref 4.14–5.8)
SODIUM SERPL-SCNC: 137 MMOL/L (ref 136–145)
TRIGL SERPL-MCNC: 117 MG/DL (ref 0–150)
VLDLC SERPL CALC-MCNC: 21 MG/DL (ref 5–40)
WBC # BLD AUTO: 9.67 10*3/MM3 (ref 3.4–10.8)

## 2023-04-17 DIAGNOSIS — E78.2 MIXED HYPERLIPIDEMIA: Primary | ICD-10-CM

## 2023-04-17 RX ORDER — PRAVASTATIN SODIUM 20 MG
20 TABLET ORAL DAILY
Qty: 90 TABLET | Refills: 0 | Status: SHIPPED | OUTPATIENT
Start: 2023-04-17

## 2023-10-05 ENCOUNTER — OFFICE VISIT (OUTPATIENT)
Dept: FAMILY MEDICINE CLINIC | Facility: CLINIC | Age: 58
End: 2023-10-05
Payer: COMMERCIAL

## 2023-10-05 VITALS
TEMPERATURE: 96.9 F | SYSTOLIC BLOOD PRESSURE: 114 MMHG | WEIGHT: 315 LBS | OXYGEN SATURATION: 97 % | HEART RATE: 68 BPM | BODY MASS INDEX: 39.17 KG/M2 | HEIGHT: 75 IN | DIASTOLIC BLOOD PRESSURE: 81 MMHG

## 2023-10-05 DIAGNOSIS — I82.402 ACUTE DEEP VEIN THROMBOSIS (DVT) OF LEFT LOWER EXTREMITY, UNSPECIFIED VEIN: Primary | ICD-10-CM

## 2023-10-05 DIAGNOSIS — E78.2 MIXED HYPERLIPIDEMIA: ICD-10-CM

## 2023-10-05 DIAGNOSIS — Z23 FLU VACCINE NEED: ICD-10-CM

## 2023-10-05 DIAGNOSIS — I10 ESSENTIAL HYPERTENSION: Chronic | ICD-10-CM

## 2023-10-05 RX ORDER — AMLODIPINE BESYLATE AND BENAZEPRIL HYDROCHLORIDE 5; 20 MG/1; MG/1
1 CAPSULE ORAL DAILY
Qty: 90 CAPSULE | Refills: 1 | Status: SHIPPED | OUTPATIENT
Start: 2023-10-05

## 2023-10-05 RX ORDER — THIAMINE HCL 100 MG
1 TABLET ORAL DAILY
COMMUNITY

## 2023-10-05 RX ORDER — DIPHENOXYLATE HYDROCHLORIDE AND ATROPINE SULFATE 2.5; .025 MG/1; MG/1
TABLET ORAL DAILY
COMMUNITY

## 2023-10-05 RX ORDER — UBIDECARENONE 75 MG
100 CAPSULE ORAL DAILY
COMMUNITY

## 2023-10-05 NOTE — PROGRESS NOTES
"Chief Complaint  Follow-up (F/u htn, cholesterol, f/u ER visit for blood clot )    Subjective        Kamran Gongora presents to [facility]  History of Present Illness patient here with spouse for follow-up on hypertension and also follow-up on recent ER visit for left lower extremity DVT.  He is currently on Eliquis which she is tolerating without side effects.  He reports that last Sunday he had a little bit of calf pain and then on Wednesday while at work his leg became worse and went to ER for evaluation.  He thought he had a DVT as this felt similar to previous previous DVT 2017.     Patient reports that he does work long hours on computer at work but he gets up every 5 to 10 minutes to move around.  At home he and his wife walk most evenings and he had been walking prior to this episode.  Stays pretty well-hydrated.  No leg injuries.    He was previously anticoagulated x3 months.  Had no known cause for blood clot at that time either, but had had left hip replacement.  He has no family history that he is aware of clotting disorder.  He is status post left hip replacement 2017.    Since last visit he has been taking and tolerating his pravastatin without myalgia.  Taking and tolerating blood pressure medication without dizziness, headache, chest pain, palpitations, shortness of breath or cough.  Denies pleuritic chest pain.  No unexpected leg swelling other than from DVT.          Objective   Vital Signs:  /81   Pulse 68   Temp 96.9 °F (36.1 °C)   Ht 190.5 cm (75\")   Wt (!) 148 kg (327 lb)   SpO2 97%   BMI 40.87 kg/m²   Estimated body mass index is 40.87 kg/m² as calculated from the following:    Height as of this encounter: 190.5 cm (75\").    Weight as of this encounter: 148 kg (327 lb).               Physical Exam  Vitals and nursing note reviewed.   Constitutional:       General: He is not in acute distress.     Appearance: He is well-developed. He is not " ill-appearing or diaphoretic.   HENT:      Head: Normocephalic and atraumatic.      Ears:      Comments: Hard of hearing  Eyes:      General:         Right eye: No discharge.         Left eye: No discharge.      Conjunctiva/sclera: Conjunctivae normal.   Cardiovascular:      Rate and Rhythm: Normal rate and regular rhythm.      Heart sounds: Normal heart sounds.   Pulmonary:      Effort: Pulmonary effort is normal.      Breath sounds: Normal breath sounds.   Abdominal:      General: Bowel sounds are normal.      Palpations: Abdomen is soft.      Tenderness: There is no abdominal tenderness.   Musculoskeletal:         General: No deformity.      Right lower leg: No edema.      Left lower leg: No edema.      Comments: No left calf swelling, erythema, tenderness to palpation, negative Oxana   Skin:     General: Skin is warm and dry.   Neurological:      Mental Status: He is alert and oriented to person, place, and time.   Psychiatric:         Mood and Affect: Mood normal.         Behavior: Behavior normal.      Result Review :                   Assessment and Plan   Diagnoses and all orders for this visit:    1. Acute deep vein thrombosis (DVT) of left lower extremity, unspecified vein (Primary)  -     Ambulatory Referral to Hematology  -     apixaban (ELIQUIS) 5 MG tablet tablet; Take 1 tablet by mouth Every 12 (Twelve) Hours.  Dispense: 60 tablet; Refill: 3    2. Essential hypertension  Comments:  The patient's blood pressure is well controlled at this time. He will continue his current medication regimen.  Orders:  -     amLODIPine-benazepril (LOTREL 5-20) 5-20 MG per capsule; Take 1 capsule by mouth Daily.  Dispense: 90 capsule; Refill: 1    3. Mixed hyperlipidemia  -     Lipid Panel With LDL / HDL Ratio    4. Flu vaccine need  -     Fluzone >6 Months (2343-0674)    Acute DVT: Patient is anticoagulated.  Patient reports previous DVT was unprovoked and he completed 3 months of treatment although it seems that with  timing of hip surgery that it may have been provoked.  I do not see any notes related to this in his chart though.  He did have work-up in the ER that appears negative for coagulopathies.  I will refer to hematology for recommendations on duration of anticoagulation.  Side effects, cautions, monitoring discussed with both patient and spouse.    Hypertension: Blood pressure is well controlled and will continue current medications.    Hyperlipidemia: Patient will return for lipid check next week.  Will adjust statin based on lab results.         Follow Up   No follow-ups on file.  Patient was given instructions and counseling regarding his condition or for health maintenance advice. Please see specific information pulled into the AVS if appropriate.       Answers submitted by the patient for this visit:  Primary Reason for Visit (Submitted on 10/5/2023)  What is the primary reason for your visit?: Other  Other (Submitted on 10/5/2023)  Please describe your symptoms.: Blood clot and already had an appointment scheduled  Have you had these symptoms before?: Yes  How long have you been having these symptoms?: 1-4 days  Please list any medications you are currently taking for this condition.: Benzel and dravastatin 20 mg, Something for the blood clot  Please describe any probable cause for these symptoms. : Little exercise

## 2023-10-12 ENCOUNTER — LAB (OUTPATIENT)
Dept: LAB | Facility: HOSPITAL | Age: 58
End: 2023-10-12
Payer: COMMERCIAL

## 2023-10-12 LAB
CHOLEST SERPL-MCNC: 164 MG/DL (ref 0–200)
HDLC SERPL-MCNC: 47 MG/DL (ref 40–60)
LDLC SERPL CALC-MCNC: 101 MG/DL (ref 0–100)
LDLC/HDLC SERPL: 2.11 {RATIO}
TRIGL SERPL-MCNC: 88 MG/DL (ref 0–150)
VLDLC SERPL-MCNC: 16 MG/DL (ref 5–40)

## 2023-10-12 PROCEDURE — 80061 LIPID PANEL: CPT | Performed by: NURSE PRACTITIONER

## 2023-10-13 DIAGNOSIS — E78.2 MIXED HYPERLIPIDEMIA: ICD-10-CM

## 2023-10-13 RX ORDER — PRAVASTATIN SODIUM 20 MG
20 TABLET ORAL DAILY
Qty: 90 TABLET | Refills: 1 | Status: SHIPPED | OUTPATIENT
Start: 2023-10-13

## 2023-12-04 NOTE — PROGRESS NOTES
REASON FOR CONSULTATION: Recurrent DVT  Provide an opinion on any further workup or treatment                             REQUESTING PHYSICIAN: Katherine Cunningham    RECORDS OBTAINED:  Records of the patients history including those obtained from the referring provider were reviewed and summarized in detail.    HISTORY OF PRESENT ILLNESS:  The patient is a 58 y.o. year old male who is here for an opinion about the above issue.    History of Present Illness   The patient is a 58-year-old followed with below medical disorders who had presented to [facility] 9/13/2023 with left lower extremity pain.  He had a previous history of DVT in the same extremity and upon reevaluation was found to have acute occlusive DVT in the distal femoral, popliteal gastrocnemius, posterior tibial and peroneal veins.    The patient was heparinized with D-dimer found to be 4925, homocystine at 9.0 negative lupus anticoagulant, AT III and was transition to [facility] on discharge.  He was seen by his primary care-nurse practitioner-10/5/2023 with a history that included previous anticoagulation for 3 months-3/24/2015.  He more recently has been working at the [employer] for prolonged periods but moved to stand periodically and walks most evenings with his wife, stays well-hydrated.  He is status post left hip replacement 2017.    We are asked to see the patient for his recurrent DVT with the previous DVT also unprovoked.  He is now referred for general assessment including length of anticoagulation.  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  There is no family history, additionally, a hypercoagulable state or any member having DVT, PE or pregnancy loss.    Past Medical History:   Diagnosis Date    DVT (deep venous thrombosis)     History of arthritis     History of hyperlipidemia     HL (hearing loss)      Hypertension 15 years ago    Obesity long time        Past Surgical History:   Procedure Laterality Date    JOINT REPLACEMENT  2017    KIDNEY SURGERY      TOTAL HIP ARTHROPLASTY Left         Current Outpatient Medications on File Prior to Visit   Medication Sig Dispense Refill    amLODIPine-benazepril (LOTREL 5-20) 5-20 MG per capsule Take 1 capsule by mouth Daily. 90 capsule 1    apixaban (ELIQUIS) 5 MG tablet tablet Take 1 tablet by mouth Every 12 (Twelve) Hours. 60 tablet 3    Calcium Citrate-Vitamin D3 (CITRACAL) 315-6.25 MG-MCG tablet tablet Take 1 tablet by mouth Daily.      ELDERBERRY PO Take  by mouth.      multivitamin (MULTI VITAMIN DAILY PO) Take  by mouth Daily.      pravastatin (Pravachol) 20 MG tablet Take 1 tablet by mouth Daily. 90 tablet 1    vitamin B-12 (CYANOCOBALAMIN) 100 MCG tablet Take 1 tablet by mouth Daily.      vitamin C (ASCORBIC ACID) 250 MG tablet Take 1 tablet by mouth Daily.      VITAMIN D PO Take  by mouth. (Patient not taking: Reported on 12/5/2023)       No current facility-administered medications on file prior to visit.        ALLERGIES:  No Known Allergies     Social History     Socioeconomic History    Marital status:      Spouse name: Angelique   Tobacco Use    Smoking status: Former     Types: Cigars, Cigarettes    Smokeless tobacco: Never   Vaping Use    Vaping Use: Never used   Substance and Sexual Activity    Alcohol use: Yes     Alcohol/week: 6.0 standard drinks of alcohol     Types: 6 Cans of beer per week     Comment: occasionally    Drug use: Never    Sexual activity: Yes     Partners: Female     Birth control/protection: None        Family History   Problem Relation Age of Onset    Diabetes Mother     Hyperlipidemia Mother     Hearing loss Paternal Grandmother     Alcohol abuse Maternal Uncle         Review of Systems   Constitutional:  Negative for activity change, diaphoresis, fatigue, fever and unexpected weight change.   HENT: Negative.     Eyes: Negative.   "  Respiratory: Negative.     Cardiovascular: Negative.    Gastrointestinal: Negative.    Musculoskeletal: Negative.    Allergic/Immunologic: Negative.    Neurological: Negative.    Hematological: Negative.    Psychiatric/Behavioral:  Positive for sleep disturbance.         Objective     Vitals:    12/05/23 1457   BP: 115/81   Pulse: 83   Resp: 16   Temp: 97.5 °F (36.4 °C)   TempSrc: Temporal   SpO2: 98%   Weight: (!) 149 kg (329 lb 8 oz)   Height: 190.5 cm (75\")   PainSc: 0-No pain         12/5/2023     3:00 PM   Current Status   ECOG score 0       Physical Exam  Constitutional:       Appearance: He is obese.   HENT:      Head: Normocephalic and atraumatic.      Comments: bilateral hearing aids     Nose: Nose normal.      Mouth/Throat:      Mouth: Mucous membranes are moist.      Pharynx: Oropharynx is clear.   Eyes:      Extraocular Movements: Extraocular movements intact.      Conjunctiva/sclera: Conjunctivae normal.      Pupils: Pupils are equal, round, and reactive to light.   Cardiovascular:      Rate and Rhythm: Normal rate and regular rhythm.      Pulses: Normal pulses.      Heart sounds: Normal heart sounds.   Pulmonary:      Effort: Pulmonary effort is normal.      Breath sounds: Normal breath sounds.   Abdominal:      General: Bowel sounds are normal.      Palpations: Abdomen is soft.   Musculoskeletal:         General: Normal range of motion.      Cervical back: Normal range of motion and neck supple.   Skin:     General: Skin is warm and dry.   Neurological:      General: No focal deficit present.      Mental Status: He is oriented to person, place, and time.   Psychiatric:         Mood and Affect: Mood normal.         Behavior: Behavior normal.           RECENT LABS:  Hematology WBC   Date Value Ref Range Status   12/05/2023 8.10 3.40 - 10.80 10*3/mm3 Final   09/13/2023 11.20 (H) 4.5 - 11.0 10*3/uL Final     RBC   Date Value Ref Range Status   12/05/2023 5.18 4.14 - 5.80 10*6/mm3 Final   09/13/2023 4.82 " "4.5 - 5.9 10*6/uL Final     Hemoglobin   Date Value Ref Range Status   12/05/2023 15.7 13.0 - 17.7 g/dL Final   09/13/2023 14.6 13.5 - 17.5 g/dL Final     Hematocrit   Date Value Ref Range Status   12/05/2023 47.5 37.5 - 51.0 % Final   09/13/2023 44.8 41.0 - 53.0 % Final     Platelets   Date Value Ref Range Status   12/05/2023 319 140 - 450 10*3/mm3 Final   09/13/2023 232 140 - 440 10*3/uL Final          Assessment & Plan      58-year-old male with history of obesity, arthritis, hyperlipidemia and a previous DVT in 2015 that was apparently unprovoked and treated with 3 months of anticoagulation.  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      He does realize he will have to stay in anticoagulation indefinitely as result of the second episode of DVT.  We discussed an assessment for hypercoagulable state but recognize he will have to hold his Eliquis for 12 to 24 hours before this is drawn so as to not affect the results.    Plan:  *Continue Eliquis until Sunday of this week holding treatment that day    *Return to office on Monday a.m. for laboratory studies including- Anticardiolipin antibodies, beta-2 glycoprotein antibodies, D-dimer, factor V Leiden, prothrombin gene mutation, factor VIII level, CMP lupus anticoagulant homocystine level.    *The patient will restart anticoagulation with Eliquis just after these tests are drawn and be seen back in office approximately 3 to 4 weeks later to review findings and make additional " recommendations.    *The patient and his wife agreeable this plan and follow-up.

## 2023-12-05 ENCOUNTER — LAB (OUTPATIENT)
Dept: OTHER | Facility: HOSPITAL | Age: 58
End: 2023-12-05
Payer: COMMERCIAL

## 2023-12-05 ENCOUNTER — CONSULT (OUTPATIENT)
Dept: ONCOLOGY | Facility: CLINIC | Age: 58
End: 2023-12-05
Payer: COMMERCIAL

## 2023-12-05 VITALS
DIASTOLIC BLOOD PRESSURE: 81 MMHG | RESPIRATION RATE: 16 BRPM | OXYGEN SATURATION: 98 % | SYSTOLIC BLOOD PRESSURE: 115 MMHG | HEART RATE: 83 BPM | BODY MASS INDEX: 39.17 KG/M2 | TEMPERATURE: 97.5 F | WEIGHT: 315 LBS | HEIGHT: 75 IN

## 2023-12-05 DIAGNOSIS — I82.4Z2 ACUTE DEEP VEIN THROMBOSIS (DVT) OF DISTAL VEIN OF LEFT LOWER EXTREMITY: Primary | ICD-10-CM

## 2023-12-05 DIAGNOSIS — I82.4Y9 DEEP VEIN THROMBOSIS (DVT) OF PROXIMAL LOWER EXTREMITY, UNSPECIFIED CHRONICITY, UNSPECIFIED LATERALITY: Primary | ICD-10-CM

## 2023-12-05 LAB
BASOPHILS # BLD AUTO: 0.08 10*3/MM3 (ref 0–0.2)
BASOPHILS NFR BLD AUTO: 1 % (ref 0–1.5)
DEPRECATED RDW RBC AUTO: 43.8 FL (ref 37–54)
EOSINOPHIL # BLD AUTO: 0.11 10*3/MM3 (ref 0–0.4)
EOSINOPHIL NFR BLD AUTO: 1.4 % (ref 0.3–6.2)
ERYTHROCYTE [DISTWIDTH] IN BLOOD BY AUTOMATED COUNT: 12.9 % (ref 12.3–15.4)
HCT VFR BLD AUTO: 47.5 % (ref 37.5–51)
HGB BLD-MCNC: 15.7 G/DL (ref 13–17.7)
IMM GRANULOCYTES # BLD AUTO: 0.06 10*3/MM3 (ref 0–0.05)
IMM GRANULOCYTES NFR BLD AUTO: 0.7 % (ref 0–0.5)
LYMPHOCYTES # BLD AUTO: 1.89 10*3/MM3 (ref 0.7–3.1)
LYMPHOCYTES NFR BLD AUTO: 23.3 % (ref 19.6–45.3)
MCH RBC QN AUTO: 30.3 PG (ref 26.6–33)
MCHC RBC AUTO-ENTMCNC: 33.1 G/DL (ref 31.5–35.7)
MCV RBC AUTO: 91.7 FL (ref 79–97)
MONOCYTES # BLD AUTO: 0.64 10*3/MM3 (ref 0.1–0.9)
MONOCYTES NFR BLD AUTO: 7.9 % (ref 5–12)
NEUTROPHILS NFR BLD AUTO: 5.32 10*3/MM3 (ref 1.7–7)
NEUTROPHILS NFR BLD AUTO: 65.7 % (ref 42.7–76)
NRBC BLD AUTO-RTO: 0 /100 WBC (ref 0–0.2)
PLATELET # BLD AUTO: 319 10*3/MM3 (ref 140–450)
PMV BLD AUTO: 9.9 FL (ref 6–12)
RBC # BLD AUTO: 5.18 10*6/MM3 (ref 4.14–5.8)
WBC NRBC COR # BLD AUTO: 8.1 10*3/MM3 (ref 3.4–10.8)

## 2023-12-05 PROCEDURE — 85025 COMPLETE CBC W/AUTO DIFF WBC: CPT | Performed by: INTERNAL MEDICINE

## 2023-12-05 PROCEDURE — 36415 COLL VENOUS BLD VENIPUNCTURE: CPT

## 2023-12-05 RX ORDER — MULTIVIT WITH MINERALS/LUTEIN
250 TABLET ORAL DAILY
COMMUNITY

## 2023-12-05 NOTE — LETTER
December 5, 2023     JULIO C Shetty  [address]  [address]  [facility] [address]    Patient: Kamran Gongora   YOB: 1965   Date of Visit: 12/5/2023     Dear JULIO C Shetty:       Thank you for referring Kamran Gongora to me for evaluation. Below are the relevant portions of my assessment and plan of care.    If you have questions, please do not hesitate to call me. I look forward to following Kamran along with you.         Sincerely,        Mikey Michael MD        CC: No Recipients    Mikey Michael MD  12/05/23 1804  Sign when Signing Visit        REASON FOR CONSULTATION: Recurrent DVT  Provide an opinion on any further workup or treatment                             REQUESTING PHYSICIAN: Katherine Cunningham    RECORDS OBTAINED:  Records of the patients history including those obtained from the referring provider were reviewed and summarized in detail.    HISTORY OF PRESENT ILLNESS:  The patient is a 58 y.o. year old male who is here for an opinion about the above issue.    History of Present Illness   The patient is a 58-year-old followed with below medical disorders who had presented to [facility] 9/13/2023 with left lower extremity pain.  He had a previous history of DVT in the same extremity and upon reevaluation was found to have acute occlusive DVT in the distal femoral, popliteal gastrocnemius, posterior tibial and peroneal veins.    The patient was heparinized with D-dimer found to be 4925, homocystine at 9.0 negative lupus anticoagulant, AT III and was transition to [facility] on discharge.  He was seen by his primary care-nurse practitioner-10/5/2023 with a history that included previous anticoagulation for 3 months-3/24/2015.  He more recently has been working at the [employer] for prolonged periods but moved to stand periodically and walks most evenings with his wife, stays well-hydrated.  He is status post left hip replacement 2017.    We are asked to see  the patient for his recurrent DVT with the previous DVT also unprovoked.  He is now referred for general assessment including length of anticoagulation.  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  There is no family history, additionally, a hypercoagulable state or any member having DVT, PE or pregnancy loss.    Past Medical History:   Diagnosis Date   • DVT (deep venous thrombosis)    • History of arthritis    • History of hyperlipidemia    • HL (hearing loss)    • Hypertension 15 years ago   • Obesity long time        Past Surgical History:   Procedure Laterality Date   • JOINT REPLACEMENT  2017   • KIDNEY SURGERY     • TOTAL HIP ARTHROPLASTY Left         Current Outpatient Medications on File Prior to Visit   Medication Sig Dispense Refill   • amLODIPine-benazepril (LOTREL 5-20) 5-20 MG per capsule Take 1 capsule by mouth Daily. 90 capsule 1   • apixaban (ELIQUIS) 5 MG tablet tablet Take 1 tablet by mouth Every 12 (Twelve) Hours. 60 tablet 3   • Calcium Citrate-Vitamin D3 (CITRACAL) 315-6.25 MG-MCG tablet tablet Take 1 tablet by mouth Daily.     • ELDERBERRY PO Take  by mouth.     • multivitamin (MULTI VITAMIN DAILY PO) Take  by mouth Daily.     • pravastatin (Pravachol) 20 MG tablet Take 1 tablet by mouth Daily. 90 tablet 1   • vitamin B-12 (CYANOCOBALAMIN) 100 MCG tablet Take 1 tablet by mouth Daily.     • vitamin C (ASCORBIC ACID) 250 MG tablet Take 1 tablet by mouth Daily.     • VITAMIN D PO Take  by mouth. (Patient not taking: Reported on 12/5/2023)       No current facility-administered medications on file prior to visit.        ALLERGIES:  No Known Allergies     Social History     Socioeconomic History   • Marital status:      Spouse name: Angelique   Tobacco Use   • Smoking status: Former     Types: Cigars, Cigarettes   • Smokeless tobacco: Never   Vaping Use   • Vaping  "Use: Never used   Substance and Sexual Activity   • Alcohol use: Yes     Alcohol/week: 6.0 standard drinks of alcohol     Types: 6 Cans of beer per week     Comment: occasionally   • Drug use: Never   • Sexual activity: Yes     Partners: Female     Birth control/protection: None        Family History   Problem Relation Age of Onset   • Diabetes Mother    • Hyperlipidemia Mother    • Hearing loss Paternal Grandmother    • Alcohol abuse Maternal Uncle         Review of Systems   Constitutional:  Negative for activity change, diaphoresis, fatigue, fever and unexpected weight change.   HENT: Negative.     Eyes: Negative.    Respiratory: Negative.     Cardiovascular: Negative.    Gastrointestinal: Negative.    Musculoskeletal: Negative.    Allergic/Immunologic: Negative.    Neurological: Negative.    Hematological: Negative.    Psychiatric/Behavioral:  Positive for sleep disturbance.         Objective    Vitals:    12/05/23 1457   BP: 115/81   Pulse: 83   Resp: 16   Temp: 97.5 °F (36.4 °C)   TempSrc: Temporal   SpO2: 98%   Weight: (!) 149 kg (329 lb 8 oz)   Height: 190.5 cm (75\")   PainSc: 0-No pain         12/5/2023     3:00 PM   Current Status   ECOG score 0       Physical Exam  Constitutional:       Appearance: He is obese.   HENT:      Head: Normocephalic and atraumatic.      Comments: bilateral hearing aids     Nose: Nose normal.      Mouth/Throat:      Mouth: Mucous membranes are moist.      Pharynx: Oropharynx is clear.   Eyes:      Extraocular Movements: Extraocular movements intact.      Conjunctiva/sclera: Conjunctivae normal.      Pupils: Pupils are equal, round, and reactive to light.   Cardiovascular:      Rate and Rhythm: Normal rate and regular rhythm.      Pulses: Normal pulses.      Heart sounds: Normal heart sounds.   Pulmonary:      Effort: Pulmonary effort is normal.      Breath sounds: Normal breath sounds.   Abdominal:      General: Bowel sounds are normal.      Palpations: Abdomen is soft. " "  Musculoskeletal:         General: Normal range of motion.      Cervical back: Normal range of motion and neck supple.   Skin:     General: Skin is warm and dry.   Neurological:      General: No focal deficit present.      Mental Status: He is oriented to person, place, and time.   Psychiatric:         Mood and Affect: Mood normal.         Behavior: Behavior normal.           RECENT LABS:  Hematology WBC   Date Value Ref Range Status   12/05/2023 8.10 3.40 - 10.80 10*3/mm3 Final   09/13/2023 11.20 (H) 4.5 - 11.0 10*3/uL Final     RBC   Date Value Ref Range Status   12/05/2023 5.18 4.14 - 5.80 10*6/mm3 Final   09/13/2023 4.82 4.5 - 5.9 10*6/uL Final     Hemoglobin   Date Value Ref Range Status   12/05/2023 15.7 13.0 - 17.7 g/dL Final   09/13/2023 14.6 13.5 - 17.5 g/dL Final     Hematocrit   Date Value Ref Range Status   12/05/2023 47.5 37.5 - 51.0 % Final   09/13/2023 44.8 41.0 - 53.0 % Final     Platelets   Date Value Ref Range Status   12/05/2023 319 140 - 450 10*3/mm3 Final   09/13/2023 232 140 - 440 10*3/uL Final          Assessment & Plan     58-year-old male with history of obesity, arthritis, hyperlipidemia and a previous DVT in 2015 that was apparently unprovoked and treated with 3 months of anticoagulation.  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      He does realize he will have to stay in anticoagulation indefinitely as result of the second episode of DVT.  We discussed an assessment for hypercoagulable state but recognize " he will have to hold his Eliquis for 12 to 24 hours before this is drawn so as to not affect the results.    Plan:  *Continue Eliquis until Sunday of this week holding treatment that day    *Return to office on Monday a.m. for laboratory studies including- Anticardiolipin antibodies, beta-2 glycoprotein antibodies, D-dimer, factor V Leiden, prothrombin gene mutation, factor VIII level, CMP lupus anticoagulant homocystine level.    *The patient will restart anticoagulation with Eliquis just after these tests are drawn and be seen back in office approximately 3 to 4 weeks later to review findings and make additional recommendations.    *The patient and his wife agreeable this plan and follow-up.

## 2023-12-11 ENCOUNTER — LAB (OUTPATIENT)
Dept: LAB | Facility: HOSPITAL | Age: 58
End: 2023-12-11
Payer: COMMERCIAL

## 2023-12-11 DIAGNOSIS — I82.4Z2 ACUTE DEEP VEIN THROMBOSIS (DVT) OF DISTAL VEIN OF LEFT LOWER EXTREMITY: ICD-10-CM

## 2023-12-11 LAB
ALBUMIN SERPL-MCNC: 4.2 G/DL (ref 3.5–5.2)
ALBUMIN/GLOB SERPL: 1.4 G/DL
ALP SERPL-CCNC: 95 U/L (ref 39–117)
ALT SERPL W P-5'-P-CCNC: 20 U/L (ref 1–41)
ANION GAP SERPL CALCULATED.3IONS-SCNC: 11.8 MMOL/L (ref 5–15)
AST SERPL-CCNC: 21 U/L (ref 1–40)
BASOPHILS # BLD AUTO: 0.07 10*3/MM3 (ref 0–0.2)
BASOPHILS NFR BLD AUTO: 0.8 % (ref 0–1.5)
BILIRUB SERPL-MCNC: 0.6 MG/DL (ref 0–1.2)
BUN SERPL-MCNC: 17 MG/DL (ref 6–20)
BUN/CREAT SERPL: 16.8 (ref 7–25)
CALCIUM SPEC-SCNC: 9.3 MG/DL (ref 8.6–10.5)
CHLORIDE SERPL-SCNC: 102 MMOL/L (ref 98–107)
CO2 SERPL-SCNC: 27.2 MMOL/L (ref 22–29)
CREAT SERPL-MCNC: 1.01 MG/DL (ref 0.76–1.27)
D DIMER PPP FEU-MCNC: 0.58 MCGFEU/ML (ref 0–0.57)
DEPRECATED RDW RBC AUTO: 45.6 FL (ref 37–54)
EGFRCR SERPLBLD CKD-EPI 2021: 86.7 ML/MIN/1.73
EOSINOPHIL # BLD AUTO: 0.17 10*3/MM3 (ref 0–0.4)
EOSINOPHIL NFR BLD AUTO: 1.8 % (ref 0.3–6.2)
ERYTHROCYTE [DISTWIDTH] IN BLOOD BY AUTOMATED COUNT: 12.9 % (ref 12.3–15.4)
GLOBULIN UR ELPH-MCNC: 3.1 GM/DL
GLUCOSE SERPL-MCNC: 108 MG/DL (ref 65–99)
HCT VFR BLD AUTO: 50.3 % (ref 37.5–51)
HCYS SERPL-MCNC: 10.6 UMOL/L (ref 0–15)
HGB BLD-MCNC: 16.5 G/DL (ref 13–17.7)
IMM GRANULOCYTES # BLD AUTO: 0.03 10*3/MM3 (ref 0–0.05)
IMM GRANULOCYTES NFR BLD AUTO: 0.3 % (ref 0–0.5)
LYMPHOCYTES # BLD AUTO: 1.45 10*3/MM3 (ref 0.7–3.1)
LYMPHOCYTES NFR BLD AUTO: 15.8 % (ref 19.6–45.3)
MCH RBC QN AUTO: 31.2 PG (ref 26.6–33)
MCHC RBC AUTO-ENTMCNC: 32.8 G/DL (ref 31.5–35.7)
MCV RBC AUTO: 95.1 FL (ref 79–97)
MONOCYTES # BLD AUTO: 0.68 10*3/MM3 (ref 0.1–0.9)
MONOCYTES NFR BLD AUTO: 7.4 % (ref 5–12)
NEUTROPHILS NFR BLD AUTO: 6.79 10*3/MM3 (ref 1.7–7)
NEUTROPHILS NFR BLD AUTO: 73.9 % (ref 42.7–76)
NRBC BLD AUTO-RTO: 0 /100 WBC (ref 0–0.2)
PLATELET # BLD AUTO: 306 10*3/MM3 (ref 140–450)
PMV BLD AUTO: 9.7 FL (ref 6–12)
POTASSIUM SERPL-SCNC: 4.6 MMOL/L (ref 3.5–5.2)
PROT SERPL-MCNC: 7.3 G/DL (ref 6–8.5)
RBC # BLD AUTO: 5.29 10*6/MM3 (ref 4.14–5.8)
SODIUM SERPL-SCNC: 141 MMOL/L (ref 136–145)
WBC NRBC COR # BLD AUTO: 9.19 10*3/MM3 (ref 3.4–10.8)

## 2023-12-11 PROCEDURE — 81240 F2 GENE: CPT | Performed by: INTERNAL MEDICINE

## 2023-12-11 PROCEDURE — 85025 COMPLETE CBC W/AUTO DIFF WBC: CPT

## 2023-12-11 PROCEDURE — 80053 COMPREHEN METABOLIC PANEL: CPT

## 2023-12-11 PROCEDURE — 85379 FIBRIN DEGRADATION QUANT: CPT | Performed by: INTERNAL MEDICINE

## 2023-12-11 PROCEDURE — 36415 COLL VENOUS BLD VENIPUNCTURE: CPT

## 2023-12-11 PROCEDURE — 83090 ASSAY OF HOMOCYSTEINE: CPT | Performed by: INTERNAL MEDICINE

## 2023-12-11 PROCEDURE — 85240 CLOT FACTOR VIII AHG 1 STAGE: CPT | Performed by: INTERNAL MEDICINE

## 2023-12-11 PROCEDURE — 81241 F5 GENE: CPT | Performed by: INTERNAL MEDICINE

## 2023-12-12 ENCOUNTER — PRIOR AUTHORIZATION (OUTPATIENT)
Dept: ONCOLOGY | Facility: CLINIC | Age: 58
End: 2023-12-12
Payer: COMMERCIAL

## 2023-12-12 LAB
FACT VIII ACT/NOR PPP: 203 % ACTIVITY (ref 60–150)
Lab: ABNORMAL

## 2023-12-12 NOTE — TELEPHONE ENCOUNTER
PA pending for Factor II 91152 and Factor V through [email or web address]. Factor 2 pending order # [id number]. Factor 5 pending order # [id number]. Asked lab to hold.

## 2023-12-13 NOTE — TELEPHONE ENCOUNTER
PA approved for Factor II and Factor V. Factor 2 auth # [id number]. Factor 5 auth # [id number]. Ok'd lab to send to [facility].

## 2023-12-15 LAB
F5 GENE MUT ANL BLD/T: NORMAL
FACTOR II, DNA ANALYSIS: NORMAL

## 2023-12-18 LAB
APTT HEX PL PPP: 7 SEC
APTT IMM NP PPP: NORMAL SEC
APTT PPP 1:1 SALINE: NORMAL SEC
APTT PPP: 28.1 SEC
B2 GLYCOPROT1 IGA SER-ACNC: <10 SAU
B2 GLYCOPROT1 IGG SER-ACNC: <10 SGU
B2 GLYCOPROT1 IGM SER-ACNC: <10 SMU
CARDIOLIPIN IGG SER IA-ACNC: <10 GPL
CARDIOLIPIN IGM SER IA-ACNC: <10 MPL
CONFIRM APTT: 1.4 SEC
CONFIRM DRVVT: NORMAL SEC
DRVVT SCREEN TO CONFIRM RATIO: NORMAL RATIO
INR PPP: 1 RATIO
LABORATORY COMMENT REPORT: NORMAL
PROTHROMBIN TIME: 10.9 SEC
SCREEN DRVVT: 44 SEC
THROMBIN TIME: 19.6 SEC

## 2024-01-03 DIAGNOSIS — I82.4Z2 ACUTE DEEP VEIN THROMBOSIS (DVT) OF DISTAL VEIN OF LEFT LOWER EXTREMITY: Primary | ICD-10-CM

## 2024-01-03 NOTE — PROGRESS NOTES
REASON FOR FOLLOWUP: Recurrent DVT      History of Present Illness   The patient is a 58-year-old followed with below medical disorders who had presented to [facility] 9/13/2023 with left lower extremity pain.  He had a previous history of DVT in the same extremity and upon reevaluation was found to have acute occlusive DVT in the distal femoral, popliteal gastrocnemius, posterior tibial and peroneal veins.    The patient was heparinized with D-dimer found to be 4925, homocystine at 9.0 negative lupus anticoagulant, AT III and was transitioned to Eliquis on discharge.  He was seen by his primary care-nurse practitioner-10/5/2023 with a history that included previous anticoagulation for 3 months-3/24/2015.  He more recently has been working at the [employer] for prolonged periods but moved to stand periodically and walks most evenings with his wife, stays well-hydrated.  He is status post left hip replacement 2017.    We are asked to see the patient for his recurrent DVT with the previous DVT also unprovoked.  He is now referred for general assessment including length of anticoagulation.  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  There is no family history, additionally, a hypercoagulable state or any member having DVT, PE or pregnancy loss.    Plans were made to reevaluate further with holding Eliquis and having ongoing testing obtained.  This was obtained 12/11 with negative findings for lupus anticoagulant, but homocystine 10.6, factor V Leiden normal, prothrombin gene mutation normal D-dimer at 0.58, Factor VIII level of 203    The patient is seen 1/4/2024 indicating that his extremity-left lower extremity-is no longer painful and these return to his usual activities.  We have discussed that his findings would indicate that he could reduce his Eliquis dose to the  prophylactic level at this point but he would have to expect to have to take this indefinitely.  He is willing for such a trial and we will change his dose to Eliquis 2.5 mg p.o. twice daily and reassess over the next several months.    Past Medical History:   Diagnosis Date    DVT (deep venous thrombosis)     History of arthritis     History of hyperlipidemia     HL (hearing loss)     Hypertension 15 years ago    Obesity long time        Past Surgical History:   Procedure Laterality Date    JOINT REPLACEMENT  2017    KIDNEY SURGERY      TOTAL HIP ARTHROPLASTY Left         Current Outpatient Medications on File Prior to Visit   Medication Sig Dispense Refill    amLODIPine-benazepril (LOTREL 5-20) 5-20 MG per capsule Take 1 capsule by mouth Daily. 90 capsule 1    Calcium Citrate-Vitamin D3 (CITRACAL) 315-6.25 MG-MCG tablet tablet Take 1 tablet by mouth Daily.      ELDERBERRY PO Take  by mouth.      multivitamin (MULTI VITAMIN DAILY PO) Take  by mouth Daily.      pravastatin (Pravachol) 20 MG tablet Take 1 tablet by mouth Daily. 90 tablet 1    vitamin B-12 (CYANOCOBALAMIN) 100 MCG tablet Take 1 tablet by mouth Daily.      vitamin C (ASCORBIC ACID) 250 MG tablet Take 1 tablet by mouth Daily.      VITAMIN D PO Take  by mouth.      [DISCONTINUED] apixaban (ELIQUIS) 5 MG tablet tablet Take 1 tablet by mouth Every 12 (Twelve) Hours. 60 tablet 3     No current facility-administered medications on file prior to visit.        ALLERGIES:  No Known Allergies     Social History     Socioeconomic History    Marital status:      Spouse name: Angelique   Tobacco Use    Smoking status: Former     Types: Cigars, Cigarettes    Smokeless tobacco: Never   Vaping Use    Vaping Use: Never used   Substance and Sexual Activity    Alcohol use: Yes     Alcohol/week: 6.0 standard drinks of alcohol     Types: 6 Cans of beer per week     Comment: occasionally    Drug use: Never    Sexual activity: Yes     Partners: Female     Birth  "control/protection: None        Family History   Problem Relation Age of Onset    Diabetes Mother     Hyperlipidemia Mother     Hearing loss Paternal Grandmother     Alcohol abuse Maternal Uncle         Review of Systems   Constitutional:  Negative for activity change, diaphoresis, fatigue, fever and unexpected weight change.   HENT: Negative.     Eyes: Negative.    Respiratory: Negative.     Cardiovascular: Negative.    Gastrointestinal: Negative.    Musculoskeletal: Negative.    Allergic/Immunologic: Negative.    Neurological: Negative.    Hematological: Negative.    Psychiatric/Behavioral:  Positive for sleep disturbance.         Objective     Vitals:    01/04/24 0916   BP: 113/78   Pulse: 102   Resp: 16   Temp: 98.2 °F (36.8 °C)   TempSrc: Temporal   SpO2: 98%   Weight: (!) 152 kg (334 lb 3.2 oz)   Height: 190.5 cm (75\")   PainSc: 0-No pain           1/4/2024     9:17 AM   Current Status   ECOG score 0       Physical Exam  Constitutional:       Appearance: He is obese.   HENT:      Head: Normocephalic and atraumatic.      Comments: bilateral hearing aids     Nose: Nose normal.      Mouth/Throat:      Mouth: Mucous membranes are moist.      Pharynx: Oropharynx is clear.   Eyes:      Extraocular Movements: Extraocular movements intact.      Conjunctiva/sclera: Conjunctivae normal.      Pupils: Pupils are equal, round, and reactive to light.   Cardiovascular:      Rate and Rhythm: Normal rate and regular rhythm.      Pulses: Normal pulses.      Heart sounds: Normal heart sounds.   Pulmonary:      Effort: Pulmonary effort is normal.      Breath sounds: Normal breath sounds.   Abdominal:      General: Bowel sounds are normal.      Palpations: Abdomen is soft.   Musculoskeletal:         General: Normal range of motion.      Cervical back: Normal range of motion and neck supple.   Skin:     General: Skin is warm and dry.   Neurological:      General: No focal deficit present.      Mental Status: He is oriented to " "person, place, and time.   Psychiatric:         Mood and Affect: Mood normal.         Behavior: Behavior normal.           RECENT LABS:  Hematology WBC   Date Value Ref Range Status   01/04/2024 6.39 3.40 - 10.80 10*3/mm3 Final   09/13/2023 11.20 (H) 4.5 - 11.0 10*3/uL Final     RBC   Date Value Ref Range Status   01/04/2024 5.23 4.14 - 5.80 10*6/mm3 Final   09/13/2023 4.82 4.5 - 5.9 10*6/uL Final     Hemoglobin   Date Value Ref Range Status   01/04/2024 16.2 13.0 - 17.7 g/dL Final   09/13/2023 14.6 13.5 - 17.5 g/dL Final     Hematocrit   Date Value Ref Range Status   01/04/2024 48.6 37.5 - 51.0 % Final   09/13/2023 44.8 41.0 - 53.0 % Final     Platelets   Date Value Ref Range Status   01/04/2024 312 140 - 450 10*3/mm3 Final   09/13/2023 232 140 - 440 10*3/uL Final          Assessment & Plan      58-year-old male with history of obesity, arthritis, hyperlipidemia and a previous DVT in 2015 that was apparently unprovoked and treated with 3 months of anticoagulation.  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      He does realize he will have to stay in anticoagulation indefinitely as result of the second episode of DVT.  We discussed an assessment for hypercoagulable state but recognize he will have to hold his Eliquis for 12 to 24 hours before this is drawn so as to not affect the results.    Plans were made to reevaluate further with holding Eliquis and having ongoing testing obtained.  This was obtained 12/11 with negative findings for " lupus anticoagulant, but homocystine 10.6, factor V Leiden normal, prothrombin gene mutation normal D-dimer at 0.58, Factor VIII level of 203.    The patient is seen 1/4/2024 and we discussed the findings suggesting evidence of residual thrombosis with slightly elevated D-dimer as well as increased risk with modestly elevated Factor VIII level and the need to continue anticoagulation.    We have discussed that his findings would indicate that he could reduce his Eliquis dose to the prophylactic level at this point but he would have to expect to have to take this indefinitely.  He is willing for such a trial and we will change his dose to Eliquis 2.5 mg p.o. twice daily and reassess over the next several months.    Plan:  *Eliquis 2.5 mg twice daily E scribed to pharmacy    *Return in 10 weeks-CBC, CMP, factor VIII level, D-dimer level*    *12 weeks MD

## 2024-01-04 ENCOUNTER — OFFICE VISIT (OUTPATIENT)
Dept: ONCOLOGY | Facility: CLINIC | Age: 59
End: 2024-01-04
Payer: COMMERCIAL

## 2024-01-04 ENCOUNTER — LAB (OUTPATIENT)
Dept: LAB | Facility: HOSPITAL | Age: 59
End: 2024-01-04
Payer: COMMERCIAL

## 2024-01-04 VITALS
WEIGHT: 315 LBS | TEMPERATURE: 98.2 F | HEART RATE: 102 BPM | HEIGHT: 75 IN | SYSTOLIC BLOOD PRESSURE: 113 MMHG | DIASTOLIC BLOOD PRESSURE: 78 MMHG | RESPIRATION RATE: 16 BRPM | OXYGEN SATURATION: 98 % | BODY MASS INDEX: 39.17 KG/M2

## 2024-01-04 DIAGNOSIS — I82.4Z2 ACUTE DEEP VEIN THROMBOSIS (DVT) OF DISTAL VEIN OF LEFT LOWER EXTREMITY: Primary | ICD-10-CM

## 2024-01-04 DIAGNOSIS — I82.402 ACUTE DEEP VEIN THROMBOSIS (DVT) OF LEFT LOWER EXTREMITY, UNSPECIFIED VEIN: ICD-10-CM

## 2024-01-04 DIAGNOSIS — I82.4Z2 ACUTE DEEP VEIN THROMBOSIS (DVT) OF DISTAL VEIN OF LEFT LOWER EXTREMITY: ICD-10-CM

## 2024-01-04 LAB
BASOPHILS # BLD AUTO: 0.08 10*3/MM3 (ref 0–0.2)
BASOPHILS NFR BLD AUTO: 1.3 % (ref 0–1.5)
DEPRECATED RDW RBC AUTO: 44.1 FL (ref 37–54)
EOSINOPHIL # BLD AUTO: 0.16 10*3/MM3 (ref 0–0.4)
EOSINOPHIL NFR BLD AUTO: 2.5 % (ref 0.3–6.2)
ERYTHROCYTE [DISTWIDTH] IN BLOOD BY AUTOMATED COUNT: 13 % (ref 12.3–15.4)
HCT VFR BLD AUTO: 48.6 % (ref 37.5–51)
HGB BLD-MCNC: 16.2 G/DL (ref 13–17.7)
IMM GRANULOCYTES # BLD AUTO: 0.02 10*3/MM3 (ref 0–0.05)
IMM GRANULOCYTES NFR BLD AUTO: 0.3 % (ref 0–0.5)
LYMPHOCYTES # BLD AUTO: 1.6 10*3/MM3 (ref 0.7–3.1)
LYMPHOCYTES NFR BLD AUTO: 25 % (ref 19.6–45.3)
MCH RBC QN AUTO: 31 PG (ref 26.6–33)
MCHC RBC AUTO-ENTMCNC: 33.3 G/DL (ref 31.5–35.7)
MCV RBC AUTO: 92.9 FL (ref 79–97)
MONOCYTES # BLD AUTO: 0.6 10*3/MM3 (ref 0.1–0.9)
MONOCYTES NFR BLD AUTO: 9.4 % (ref 5–12)
NEUTROPHILS NFR BLD AUTO: 3.93 10*3/MM3 (ref 1.7–7)
NEUTROPHILS NFR BLD AUTO: 61.5 % (ref 42.7–76)
NRBC BLD AUTO-RTO: 0 /100 WBC (ref 0–0.2)
PLATELET # BLD AUTO: 312 10*3/MM3 (ref 140–450)
PMV BLD AUTO: 9.7 FL (ref 6–12)
RBC # BLD AUTO: 5.23 10*6/MM3 (ref 4.14–5.8)
WBC NRBC COR # BLD AUTO: 6.39 10*3/MM3 (ref 3.4–10.8)

## 2024-01-04 PROCEDURE — 99214 OFFICE O/P EST MOD 30 MIN: CPT | Performed by: INTERNAL MEDICINE

## 2024-01-04 PROCEDURE — 85025 COMPLETE CBC W/AUTO DIFF WBC: CPT

## 2024-01-04 PROCEDURE — 36415 COLL VENOUS BLD VENIPUNCTURE: CPT

## 2024-01-04 NOTE — LETTER
January 4, 2024     JULIO C Shetty  [address]  [address]  [facility] [address]    Patient: Kamran Gongora   YOB: 1965   Date of Visit: 1/4/2024     Dear JULIO C Shetty:       Thank you for referring Kamran Gongora to me for evaluation. Below are the relevant portions of my assessment and plan of care.    If you have questions, please do not hesitate to call me. I look forward to following Kamran along with you.         Sincerely,        Mikey Michael MD        CC: No Recipients    Mikey Michael MD  01/04/24 0951  Sign when Signing Visit        REASON FOR FOLLOWUP: Recurrent DVT      History of Present Illness   The patient is a 58-year-old followed with below medical disorders who had presented to [facility] 9/13/2023 with left lower extremity pain.  He had a previous history of DVT in the same extremity and upon reevaluation was found to have acute occlusive DVT in the distal femoral, popliteal gastrocnemius, posterior tibial and peroneal veins.    The patient was heparinized with D-dimer found to be 4925, homocystine at 9.0 negative lupus anticoagulant, AT III and was transitioned to Eliquis on discharge.  He was seen by his primary care-nurse practitioner-10/5/2023 with a history that included previous anticoagulation for 3 months-3/24/2015.  He more recently has been working at the [employer] for prolonged periods but moved to stand periodically and walks most evenings with his wife, stays well-hydrated.  He is status post left hip replacement 2017.    We are asked to see the patient for his recurrent DVT with the previous DVT also unprovoked.  He is now referred for general assessment including length of anticoagulation.  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  There is no family history, additionally, a  hypercoagulable state or any member having DVT, PE or pregnancy loss.    Plans were made to reevaluate further with holding Eliquis and having ongoing testing obtained.  This was obtained 12/11 with negative findings for lupus anticoagulant, but homocystine 10.6, factor V Leiden normal, prothrombin gene mutation normal D-dimer at 0.58, Factor VIII level of 203    The patient is seen 1/4/2024 indicating that his extremity-left lower extremity-is no longer painful and these return to his usual activities.  We have discussed that his findings would indicate that he could reduce his Eliquis dose to the prophylactic level at this point but he would have to expect to have to take this indefinitely.  He is willing for such a trial and we will change his dose to Eliquis 2.5 mg p.o. twice daily and reassess over the next several months.    Past Medical History:   Diagnosis Date   • DVT (deep venous thrombosis)    • History of arthritis    • History of hyperlipidemia    • HL (hearing loss)    • Hypertension 15 years ago   • Obesity long time        Past Surgical History:   Procedure Laterality Date   • JOINT REPLACEMENT  2017   • KIDNEY SURGERY     • TOTAL HIP ARTHROPLASTY Left         Current Outpatient Medications on File Prior to Visit   Medication Sig Dispense Refill   • amLODIPine-benazepril (LOTREL 5-20) 5-20 MG per capsule Take 1 capsule by mouth Daily. 90 capsule 1   • Calcium Citrate-Vitamin D3 (CITRACAL) 315-6.25 MG-MCG tablet tablet Take 1 tablet by mouth Daily.     • ELDERBERRY PO Take  by mouth.     • multivitamin (MULTI VITAMIN DAILY PO) Take  by mouth Daily.     • pravastatin (Pravachol) 20 MG tablet Take 1 tablet by mouth Daily. 90 tablet 1   • vitamin B-12 (CYANOCOBALAMIN) 100 MCG tablet Take 1 tablet by mouth Daily.     • vitamin C (ASCORBIC ACID) 250 MG tablet Take 1 tablet by mouth Daily.     • VITAMIN D PO Take  by mouth.     • [DISCONTINUED] apixaban (ELIQUIS) 5 MG tablet tablet Take 1 tablet by mouth  "Every 12 (Twelve) Hours. 60 tablet 3     No current facility-administered medications on file prior to visit.        ALLERGIES:  No Known Allergies     Social History     Socioeconomic History   • Marital status:      Spouse name: Angelique   Tobacco Use   • Smoking status: Former     Types: Cigars, Cigarettes   • Smokeless tobacco: Never   Vaping Use   • Vaping Use: Never used   Substance and Sexual Activity   • Alcohol use: Yes     Alcohol/week: 6.0 standard drinks of alcohol     Types: 6 Cans of beer per week     Comment: occasionally   • Drug use: Never   • Sexual activity: Yes     Partners: Female     Birth control/protection: None        Family History   Problem Relation Age of Onset   • Diabetes Mother    • Hyperlipidemia Mother    • Hearing loss Paternal Grandmother    • Alcohol abuse Maternal Uncle         Review of Systems   Constitutional:  Negative for activity change, diaphoresis, fatigue, fever and unexpected weight change.   HENT: Negative.     Eyes: Negative.    Respiratory: Negative.     Cardiovascular: Negative.    Gastrointestinal: Negative.    Musculoskeletal: Negative.    Allergic/Immunologic: Negative.    Neurological: Negative.    Hematological: Negative.    Psychiatric/Behavioral:  Positive for sleep disturbance.         Objective    Vitals:    01/04/24 0916   BP: 113/78   Pulse: 102   Resp: 16   Temp: 98.2 °F (36.8 °C)   TempSrc: Temporal   SpO2: 98%   Weight: (!) 152 kg (334 lb 3.2 oz)   Height: 190.5 cm (75\")   PainSc: 0-No pain           1/4/2024     9:17 AM   Current Status   ECOG score 0       Physical Exam  Constitutional:       Appearance: He is obese.   HENT:      Head: Normocephalic and atraumatic.      Comments: bilateral hearing aids     Nose: Nose normal.      Mouth/Throat:      Mouth: Mucous membranes are moist.      Pharynx: Oropharynx is clear.   Eyes:      Extraocular Movements: Extraocular movements intact.      Conjunctiva/sclera: Conjunctivae normal.      Pupils: Pupils " "are equal, round, and reactive to light.   Cardiovascular:      Rate and Rhythm: Normal rate and regular rhythm.      Pulses: Normal pulses.      Heart sounds: Normal heart sounds.   Pulmonary:      Effort: Pulmonary effort is normal.      Breath sounds: Normal breath sounds.   Abdominal:      General: Bowel sounds are normal.      Palpations: Abdomen is soft.   Musculoskeletal:         General: Normal range of motion.      Cervical back: Normal range of motion and neck supple.   Skin:     General: Skin is warm and dry.   Neurological:      General: No focal deficit present.      Mental Status: He is oriented to person, place, and time.   Psychiatric:         Mood and Affect: Mood normal.         Behavior: Behavior normal.           RECENT LABS:  Hematology WBC   Date Value Ref Range Status   01/04/2024 6.39 3.40 - 10.80 10*3/mm3 Final   09/13/2023 11.20 (H) 4.5 - 11.0 10*3/uL Final     RBC   Date Value Ref Range Status   01/04/2024 5.23 4.14 - 5.80 10*6/mm3 Final   09/13/2023 4.82 4.5 - 5.9 10*6/uL Final     Hemoglobin   Date Value Ref Range Status   01/04/2024 16.2 13.0 - 17.7 g/dL Final   09/13/2023 14.6 13.5 - 17.5 g/dL Final     Hematocrit   Date Value Ref Range Status   01/04/2024 48.6 37.5 - 51.0 % Final   09/13/2023 44.8 41.0 - 53.0 % Final     Platelets   Date Value Ref Range Status   01/04/2024 312 140 - 450 10*3/mm3 Final   09/13/2023 232 140 - 440 10*3/uL Final          Assessment & Plan     58-year-old male with history of obesity, arthritis, hyperlipidemia and a previous DVT in 2015 that was apparently unprovoked and treated with 3 months of anticoagulation.  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      He does realize he will have to stay in anticoagulation indefinitely as result of the second episode of DVT.  We discussed an assessment for hypercoagulable state but recognize he will have to hold his Eliquis for 12 to 24 hours before this is drawn so as to not affect the results.    Plans were made to reevaluate further with holding Eliquis and having ongoing testing obtained.  This was obtained 12/11 with negative findings for lupus anticoagulant, but homocystine 10.6, factor V Leiden normal, prothrombin gene mutation normal D-dimer at 0.58, Factor VIII level of 203.    The patient is seen 1/4/2024 and we discussed the findings suggesting evidence of residual thrombosis with slightly elevated D-dimer as well as increased risk with modestly elevated Factor VIII level and the need to continue anticoagulation.    We have discussed that his findings would indicate that he could reduce his Eliquis dose to the prophylactic level at this point but he would have to expect to have to take this indefinitely.  He is willing for such a trial and we will change his dose to Eliquis 2.5 mg p.o. twice daily and reassess over the next several months.    Plan:  *Eliquis 2.5 mg twice daily E scribed to pharmacy    *Return in 10 weeks-CBC, CMP, factor VIII level, D-dimer level*    *12 weeks MD

## 2024-04-04 ENCOUNTER — LAB (OUTPATIENT)
Dept: LAB | Facility: HOSPITAL | Age: 59
End: 2024-04-04
Payer: COMMERCIAL

## 2024-04-04 DIAGNOSIS — I82.4Z2 ACUTE DEEP VEIN THROMBOSIS (DVT) OF DISTAL VEIN OF LEFT LOWER EXTREMITY: ICD-10-CM

## 2024-04-04 LAB
ALBUMIN SERPL-MCNC: 4.5 G/DL (ref 3.5–5.2)
ALBUMIN/GLOB SERPL: 1.8 G/DL
ALP SERPL-CCNC: 89 U/L (ref 39–117)
ALT SERPL W P-5'-P-CCNC: 18 U/L (ref 1–41)
ANION GAP SERPL CALCULATED.3IONS-SCNC: 8.9 MMOL/L (ref 5–15)
AST SERPL-CCNC: 20 U/L (ref 1–40)
BASOPHILS # BLD AUTO: 0.08 10*3/MM3 (ref 0–0.2)
BASOPHILS NFR BLD AUTO: 1 % (ref 0–1.5)
BILIRUB SERPL-MCNC: 0.4 MG/DL (ref 0–1.2)
BUN SERPL-MCNC: 13 MG/DL (ref 6–20)
BUN/CREAT SERPL: 13.3 (ref 7–25)
CALCIUM SPEC-SCNC: 9.7 MG/DL (ref 8.6–10.5)
CHLORIDE SERPL-SCNC: 103 MMOL/L (ref 98–107)
CO2 SERPL-SCNC: 27.1 MMOL/L (ref 22–29)
CREAT SERPL-MCNC: 0.98 MG/DL (ref 0.76–1.27)
D DIMER PPP FEU-MCNC: 0.35 MCGFEU/ML (ref 0–0.58)
DEPRECATED RDW RBC AUTO: 45.7 FL (ref 37–54)
EGFRCR SERPLBLD CKD-EPI 2021: 89.4 ML/MIN/1.73
EOSINOPHIL # BLD AUTO: 0.14 10*3/MM3 (ref 0–0.4)
EOSINOPHIL NFR BLD AUTO: 1.8 % (ref 0.3–6.2)
ERYTHROCYTE [DISTWIDTH] IN BLOOD BY AUTOMATED COUNT: 13.2 % (ref 12.3–15.4)
GLOBULIN UR ELPH-MCNC: 2.5 GM/DL
GLUCOSE SERPL-MCNC: 99 MG/DL (ref 65–99)
HCT VFR BLD AUTO: 47.8 % (ref 37.5–51)
HGB BLD-MCNC: 15.6 G/DL (ref 13–17.7)
IMM GRANULOCYTES # BLD AUTO: 0.02 10*3/MM3 (ref 0–0.05)
IMM GRANULOCYTES NFR BLD AUTO: 0.3 % (ref 0–0.5)
LYMPHOCYTES # BLD AUTO: 1.89 10*3/MM3 (ref 0.7–3.1)
LYMPHOCYTES NFR BLD AUTO: 24.6 % (ref 19.6–45.3)
MCH RBC QN AUTO: 30.8 PG (ref 26.6–33)
MCHC RBC AUTO-ENTMCNC: 32.6 G/DL (ref 31.5–35.7)
MCV RBC AUTO: 94.5 FL (ref 79–97)
MONOCYTES # BLD AUTO: 0.66 10*3/MM3 (ref 0.1–0.9)
MONOCYTES NFR BLD AUTO: 8.6 % (ref 5–12)
NEUTROPHILS NFR BLD AUTO: 4.9 10*3/MM3 (ref 1.7–7)
NEUTROPHILS NFR BLD AUTO: 63.7 % (ref 42.7–76)
NRBC BLD AUTO-RTO: 0 /100 WBC (ref 0–0.2)
PLATELET # BLD AUTO: 313 10*3/MM3 (ref 140–450)
PMV BLD AUTO: 9.5 FL (ref 6–12)
POTASSIUM SERPL-SCNC: 4.4 MMOL/L (ref 3.5–5.2)
PROT SERPL-MCNC: 7 G/DL (ref 6–8.5)
RBC # BLD AUTO: 5.06 10*6/MM3 (ref 4.14–5.8)
SODIUM SERPL-SCNC: 139 MMOL/L (ref 136–145)
WBC NRBC COR # BLD AUTO: 7.69 10*3/MM3 (ref 3.4–10.8)

## 2024-04-04 PROCEDURE — 85379 FIBRIN DEGRADATION QUANT: CPT | Performed by: INTERNAL MEDICINE

## 2024-04-04 PROCEDURE — 85025 COMPLETE CBC W/AUTO DIFF WBC: CPT

## 2024-04-04 PROCEDURE — 80053 COMPREHEN METABOLIC PANEL: CPT

## 2024-04-04 PROCEDURE — 85240 CLOT FACTOR VIII AHG 1 STAGE: CPT | Performed by: INTERNAL MEDICINE

## 2024-04-04 PROCEDURE — 36415 COLL VENOUS BLD VENIPUNCTURE: CPT

## 2024-04-05 LAB
FACT VIII ACT/NOR PPP: 167 % ACTIVITY (ref 60–150)
Lab: ABNORMAL

## 2024-04-17 NOTE — PROGRESS NOTES
REASON FOR FOLLOWUP: Recurrent DVT      History of Present Illness   The patient is a 58-year-old followed with below medical disorders who had presented to [facility] 9/13/2023 with left lower extremity pain.  He had a previous history of DVT in the same extremity and upon reevaluation was found to have acute occlusive DVT in the distal femoral, popliteal gastrocnemius, posterior tibial and peroneal veins.    The patient was heparinized with D-dimer found to be 4925, homocystine at 9.0 negative lupus anticoagulant, AT III and was transitioned to Eliquis on discharge.  He was seen by his primary care-nurse practitioner-10/5/2023 with a history that included previous anticoagulation for 3 months-3/24/2015.  He more recently has been working at the [employer] for prolonged periods but moved to stand periodically and walks most evenings with his wife, stays well-hydrated.  He is status post left hip replacement 2017.    We are asked to see the patient for his recurrent DVT with the previous DVT also unprovoked.  He is now referred for general assessment including length of anticoagulation.  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  There is no family history, additionally, a hypercoagulable state or any member having DVT, PE or pregnancy loss.    Plans were made to reevaluate further with holding Eliquis and having ongoing testing obtained.  This was obtained 12/11 with negative findings for lupus anticoagulant, but homocystine 10.6, factor V Leiden normal, prothrombin gene mutation normal D-dimer at 0.58, Factor VIII level of 203    The patient is seen 1/4/2024 indicating that his extremity-left lower extremity-is no longer painful and these return to his usual activities.  We have discussed that his findings would indicate that he could reduce his Eliquis dose to the  prophylactic level at this point but he would have to expect to have to take this indefinitely.  He is willing for such a trial and we will change his dose to Eliquis 2.5 mg p.o. twice daily and reassess over the next several months.    The patient underwent additional studies for/4/24 with normal CBC, normal CMP, Factor VIII level down at 167 and D-dimer further reduced to 0.35.  The patient indicates that he is doing well with her current approach and has had no thrombotic episodes.  We discussed risk factors and had assumed that he been assessed for sleep apnea.  This is never occurred but he has a long history of heavy snoring according to his wife and variable fatigue.    Past Medical History:   Diagnosis Date    DVT (deep venous thrombosis)     History of arthritis     History of hyperlipidemia     HL (hearing loss)     Hypertension 15 years ago    Obesity long time        Past Surgical History:   Procedure Laterality Date    JOINT REPLACEMENT  2017    KIDNEY SURGERY      TOTAL HIP ARTHROPLASTY Left         Current Outpatient Medications on File Prior to Visit   Medication Sig Dispense Refill    amLODIPine-benazepril (LOTREL 5-20) 5-20 MG per capsule Take 1 capsule by mouth Daily. 90 capsule 1    apixaban (ELIQUIS) 2.5 MG tablet tablet Take 1 tablet by mouth Every 12 (Twelve) Hours. 60 tablet 3    Calcium Citrate-Vitamin D3 (CITRACAL) 315-6.25 MG-MCG tablet tablet Take 1 tablet by mouth Daily.      ELDERBERRY PO Take  by mouth.      multivitamin (MULTI VITAMIN DAILY PO) Take  by mouth Daily.      pravastatin (Pravachol) 20 MG tablet Take 1 tablet by mouth Daily. 90 tablet 1    vitamin B-12 (CYANOCOBALAMIN) 100 MCG tablet Take 1 tablet by mouth Daily.      vitamin C (ASCORBIC ACID) 250 MG tablet Take 1 tablet by mouth Daily.      VITAMIN D PO Take  by mouth.       No current facility-administered medications on file prior to visit.        ALLERGIES:  No Known Allergies     Social History     Socioeconomic  "History    Marital status:      Spouse name: Angelique   Tobacco Use    Smoking status: Former     Types: Cigars, Cigarettes    Smokeless tobacco: Never   Vaping Use    Vaping status: Never Used   Substance and Sexual Activity    Alcohol use: Yes     Alcohol/week: 6.0 standard drinks of alcohol     Types: 6 Cans of beer per week     Comment: occasionally    Drug use: Never    Sexual activity: Yes     Partners: Female     Birth control/protection: None        Family History   Problem Relation Age of Onset    Diabetes Mother     Hyperlipidemia Mother     Hearing loss Paternal Grandmother     Alcohol abuse Maternal Uncle         Review of Systems   Constitutional:  Negative for activity change, diaphoresis, fatigue, fever and unexpected weight change.   HENT: Negative.     Eyes: Negative.    Respiratory: Negative.     Cardiovascular: Negative.    Gastrointestinal: Negative.    Musculoskeletal: Negative.    Allergic/Immunologic: Negative.    Neurological: Negative.    Hematological: Negative.    Psychiatric/Behavioral:  Positive for sleep disturbance.         Objective     Vitals:    04/18/24 1313   BP: 116/81   Pulse: 92   Resp: 18   Temp: 97.7 °F (36.5 °C)   TempSrc: Temporal   SpO2: 96%   Weight: (!) 151 kg (332 lb 8 oz)   Height: 190.5 cm (75\")   PainSc: 0-No pain             4/18/2024     1:08 PM   Current Status   ECOG score 0       Physical Exam  Constitutional:       Appearance: He is obese.   HENT:      Head: Normocephalic and atraumatic.      Comments: bilateral hearing aids     Nose: Nose normal.      Mouth/Throat:      Mouth: Mucous membranes are moist.      Pharynx: Oropharynx is clear.   Eyes:      Extraocular Movements: Extraocular movements intact.      Conjunctiva/sclera: Conjunctivae normal.      Pupils: Pupils are equal, round, and reactive to light.   Cardiovascular:      Rate and Rhythm: Normal rate and regular rhythm.      Pulses: Normal pulses.      Heart sounds: Normal heart sounds. " "  Pulmonary:      Effort: Pulmonary effort is normal.      Breath sounds: Normal breath sounds.   Abdominal:      General: Bowel sounds are normal.      Palpations: Abdomen is soft.   Musculoskeletal:         General: Normal range of motion.      Cervical back: Normal range of motion and neck supple.   Skin:     General: Skin is warm and dry.   Neurological:      General: No focal deficit present.      Mental Status: He is oriented to person, place, and time.   Psychiatric:         Mood and Affect: Mood normal.         Behavior: Behavior normal.           RECENT LABS:  Hematology WBC   Date Value Ref Range Status   04/04/2024 7.69 3.40 - 10.80 10*3/mm3 Final   09/13/2023 11.20 (H) 4.5 - 11.0 10*3/uL Final     RBC   Date Value Ref Range Status   04/04/2024 5.06 4.14 - 5.80 10*6/mm3 Final   09/13/2023 4.82 4.5 - 5.9 10*6/uL Final     Hemoglobin   Date Value Ref Range Status   04/04/2024 15.6 13.0 - 17.7 g/dL Final   09/13/2023 14.6 13.5 - 17.5 g/dL Final     Hematocrit   Date Value Ref Range Status   04/04/2024 47.8 37.5 - 51.0 % Final   09/13/2023 44.8 41.0 - 53.0 % Final     Platelets   Date Value Ref Range Status   04/04/2024 313 140 - 450 10*3/mm3 Final   09/13/2023 232 140 - 440 10*3/uL Final          Assessment & Plan      59-year-old male with history of obesity, arthritis, hyperlipidemia and a previous DVT in 2015 that was apparently unprovoked and treated with 3 months of anticoagulation.  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      We have discussed that he does have some risk factors in terms of his desk job, generally sedentary lifestyle and obesity as well as possibly a degree of sleep " apnea.      He does realize he will have to stay in anticoagulation indefinitely as result of the second episode of DVT.  We discussed an assessment for hypercoagulable state but recognize he will have to hold his Eliquis for 12 to 24 hours before this is drawn so as to not affect the results.    Plans were made to reevaluate further with holding Eliquis and having ongoing testing obtained.  This was obtained 12/11 with negative findings for lupus anticoagulant, but homocystine 10.6, factor V Leiden normal, prothrombin gene mutation normal D-dimer at 0.58, Factor VIII level of 203.    The patient is seen 1/4/2024 and we discussed the findings suggesting evidence of residual thrombosis with slightly elevated D-dimer as well as increased risk with modestly elevated Factor VIII level and the need to continue anticoagulation.    We have discussed that his findings would indicate that he could reduce his Eliquis dose to the prophylactic level at this point but he would have to expect to have to take this indefinitely.  He is willing for such a trial and we will change his dose to Eliquis 2.5 mg p.o. twice daily and reassess over the next several months.    The patient underwent additional studies for/4/24 with normal CBC, normal CMP, Factor VIII level down at 167 and D-dimer further reduced to 0.35.  The patient indicates that he is doing well with her current approach and has had no thrombotic episodes.  We discussed risk factors and had assumed that he been assessed for sleep apnea.  This is never occurred but he has a long history of heavy snoring according to his wife and variable fatigue    Plan:  *Continue Eliquis 2.5 mg twice daily     *Sleep evaluation-Dr. Shon San    *Return in 23 weeks-CBC, CMP, factor VIII level, D-dimer level*    *24 weeks MD

## 2024-04-18 ENCOUNTER — OFFICE VISIT (OUTPATIENT)
Dept: ONCOLOGY | Facility: CLINIC | Age: 59
End: 2024-04-18
Payer: COMMERCIAL

## 2024-04-18 VITALS
RESPIRATION RATE: 18 BRPM | OXYGEN SATURATION: 96 % | BODY MASS INDEX: 39.17 KG/M2 | SYSTOLIC BLOOD PRESSURE: 116 MMHG | HEIGHT: 75 IN | TEMPERATURE: 97.7 F | WEIGHT: 315 LBS | DIASTOLIC BLOOD PRESSURE: 81 MMHG | HEART RATE: 92 BPM

## 2024-04-18 DIAGNOSIS — I82.402 ACUTE DEEP VEIN THROMBOSIS (DVT) OF LEFT LOWER EXTREMITY, UNSPECIFIED VEIN: Primary | ICD-10-CM

## 2024-04-18 DIAGNOSIS — I82.4Z2 ACUTE DEEP VEIN THROMBOSIS (DVT) OF DISTAL VEIN OF LEFT LOWER EXTREMITY: Primary | ICD-10-CM

## 2024-04-18 PROCEDURE — 99214 OFFICE O/P EST MOD 30 MIN: CPT | Performed by: INTERNAL MEDICINE

## 2024-04-18 NOTE — LETTER
April 18, 2024     JULIO C Shetty  [address]  [address]  [facility] [address]    Patient: Kamran Gongora   YOB: 1965   Date of Visit: 4/18/2024     Dear JULIO C Shetty:       Thank you for referring Kamran Gongora to me for evaluation. Below are the relevant portions of my assessment and plan of care.    If you have questions, please do not hesitate to call me. I look forward to following Kamran along with you.         Sincerely,        Mikey Michael MD        CC: MD Alec Guallpa Michael D., MD  04/18/24 8492  Sign when Signing Visit        REASON FOR FOLLOWUP: Recurrent DVT      History of Present Illness   The patient is a 58-year-old followed with below medical disorders who had presented to [facility] 9/13/2023 with left lower extremity pain.  He had a previous history of DVT in the same extremity and upon reevaluation was found to have acute occlusive DVT in the distal femoral, popliteal gastrocnemius, posterior tibial and peroneal veins.    The patient was heparinized with D-dimer found to be 4925, homocystine at 9.0 negative lupus anticoagulant, AT III and was transitioned to Eliquis on discharge.  He was seen by his primary care-nurse practitioner-10/5/2023 with a history that included previous anticoagulation for 3 months-3/24/2015.  He more recently has been working at the [employer] for prolonged periods but moved to stand periodically and walks most evenings with his wife, stays well-hydrated.  He is status post left hip replacement 2017.    We are asked to see the patient for his recurrent DVT with the previous DVT also unprovoked.  He is now referred for general assessment including length of anticoagulation.  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  There is no family history, additionally, a  hypercoagulable state or any member having DVT, PE or pregnancy loss.    Plans were made to reevaluate further with holding Eliquis and having ongoing testing obtained.  This was obtained 12/11 with negative findings for lupus anticoagulant, but homocystine 10.6, factor V Leiden normal, prothrombin gene mutation normal D-dimer at 0.58, Factor VIII level of 203    The patient is seen 1/4/2024 indicating that his extremity-left lower extremity-is no longer painful and these return to his usual activities.  We have discussed that his findings would indicate that he could reduce his Eliquis dose to the prophylactic level at this point but he would have to expect to have to take this indefinitely.  He is willing for such a trial and we will change his dose to Eliquis 2.5 mg p.o. twice daily and reassess over the next several months.    The patient underwent additional studies for/4/24 with normal CBC, normal CMP, Factor VIII level down at 167 and D-dimer further reduced to 0.35.  The patient indicates that he is doing well with her current approach and has had no thrombotic episodes.  We discussed risk factors and had assumed that he been assessed for sleep apnea.  This is never occurred but he has a long history of heavy snoring according to his wife and variable fatigue.    Past Medical History:   Diagnosis Date   • DVT (deep venous thrombosis)    • History of arthritis    • History of hyperlipidemia    • HL (hearing loss)    • Hypertension 15 years ago   • Obesity long time        Past Surgical History:   Procedure Laterality Date   • JOINT REPLACEMENT  2017   • KIDNEY SURGERY     • TOTAL HIP ARTHROPLASTY Left         Current Outpatient Medications on File Prior to Visit   Medication Sig Dispense Refill   • amLODIPine-benazepril (LOTREL 5-20) 5-20 MG per capsule Take 1 capsule by mouth Daily. 90 capsule 1   • apixaban (ELIQUIS) 2.5 MG tablet tablet Take 1 tablet by mouth Every 12 (Twelve) Hours. 60 tablet 3   •  "Calcium Citrate-Vitamin D3 (CITRACAL) 315-6.25 MG-MCG tablet tablet Take 1 tablet by mouth Daily.     • ELDERBERRY PO Take  by mouth.     • multivitamin (MULTI VITAMIN DAILY PO) Take  by mouth Daily.     • pravastatin (Pravachol) 20 MG tablet Take 1 tablet by mouth Daily. 90 tablet 1   • vitamin B-12 (CYANOCOBALAMIN) 100 MCG tablet Take 1 tablet by mouth Daily.     • vitamin C (ASCORBIC ACID) 250 MG tablet Take 1 tablet by mouth Daily.     • VITAMIN D PO Take  by mouth.       No current facility-administered medications on file prior to visit.        ALLERGIES:  No Known Allergies     Social History     Socioeconomic History   • Marital status:      Spouse name: Angelique   Tobacco Use   • Smoking status: Former     Types: Cigars, Cigarettes   • Smokeless tobacco: Never   Vaping Use   • Vaping status: Never Used   Substance and Sexual Activity   • Alcohol use: Yes     Alcohol/week: 6.0 standard drinks of alcohol     Types: 6 Cans of beer per week     Comment: occasionally   • Drug use: Never   • Sexual activity: Yes     Partners: Female     Birth control/protection: None        Family History   Problem Relation Age of Onset   • Diabetes Mother    • Hyperlipidemia Mother    • Hearing loss Paternal Grandmother    • Alcohol abuse Maternal Uncle         Review of Systems   Constitutional:  Negative for activity change, diaphoresis, fatigue, fever and unexpected weight change.   HENT: Negative.     Eyes: Negative.    Respiratory: Negative.     Cardiovascular: Negative.    Gastrointestinal: Negative.    Musculoskeletal: Negative.    Allergic/Immunologic: Negative.    Neurological: Negative.    Hematological: Negative.    Psychiatric/Behavioral:  Positive for sleep disturbance.         Objective    Vitals:    04/18/24 1313   BP: 116/81   Pulse: 92   Resp: 18   Temp: 97.7 °F (36.5 °C)   TempSrc: Temporal   SpO2: 96%   Weight: (!) 151 kg (332 lb 8 oz)   Height: 190.5 cm (75\")   PainSc: 0-No pain             4/18/2024     " 1:08 PM   Current Status   ECOG score 0       Physical Exam  Constitutional:       Appearance: He is obese.   HENT:      Head: Normocephalic and atraumatic.      Comments: bilateral hearing aids     Nose: Nose normal.      Mouth/Throat:      Mouth: Mucous membranes are moist.      Pharynx: Oropharynx is clear.   Eyes:      Extraocular Movements: Extraocular movements intact.      Conjunctiva/sclera: Conjunctivae normal.      Pupils: Pupils are equal, round, and reactive to light.   Cardiovascular:      Rate and Rhythm: Normal rate and regular rhythm.      Pulses: Normal pulses.      Heart sounds: Normal heart sounds.   Pulmonary:      Effort: Pulmonary effort is normal.      Breath sounds: Normal breath sounds.   Abdominal:      General: Bowel sounds are normal.      Palpations: Abdomen is soft.   Musculoskeletal:         General: Normal range of motion.      Cervical back: Normal range of motion and neck supple.   Skin:     General: Skin is warm and dry.   Neurological:      General: No focal deficit present.      Mental Status: He is oriented to person, place, and time.   Psychiatric:         Mood and Affect: Mood normal.         Behavior: Behavior normal.           RECENT LABS:  Hematology WBC   Date Value Ref Range Status   04/04/2024 7.69 3.40 - 10.80 10*3/mm3 Final   09/13/2023 11.20 (H) 4.5 - 11.0 10*3/uL Final     RBC   Date Value Ref Range Status   04/04/2024 5.06 4.14 - 5.80 10*6/mm3 Final   09/13/2023 4.82 4.5 - 5.9 10*6/uL Final     Hemoglobin   Date Value Ref Range Status   04/04/2024 15.6 13.0 - 17.7 g/dL Final   09/13/2023 14.6 13.5 - 17.5 g/dL Final     Hematocrit   Date Value Ref Range Status   04/04/2024 47.8 37.5 - 51.0 % Final   09/13/2023 44.8 41.0 - 53.0 % Final     Platelets   Date Value Ref Range Status   04/04/2024 313 140 - 450 10*3/mm3 Final   09/13/2023 232 140 - 440 10*3/uL Final          Assessment & Plan     59-year-old male with history of obesity, arthritis, hyperlipidemia and a  "previous DVT in 2015 that was apparently unprovoked and treated with 3 months of anticoagulation.  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      He does realize he will have to stay in anticoagulation indefinitely as result of the second episode of DVT.  We discussed an assessment for hypercoagulable state but recognize he will have to hold his Eliquis for 12 to 24 hours before this is drawn so as to not affect the results.    Plans were made to reevaluate further with holding Eliquis and having ongoing testing obtained.  This was obtained 12/11 with negative findings for lupus anticoagulant, but homocystine 10.6, factor V Leiden normal, prothrombin gene mutation normal D-dimer at 0.58, Factor VIII level of 203.    The patient is seen 1/4/2024 and we discussed the findings suggesting evidence of residual thrombosis with slightly elevated D-dimer as well as increased risk with modestly elevated Factor VIII level and the need to continue anticoagulation.    We have discussed that his findings would indicate that he could reduce his Eliquis dose to the prophylactic level at this point but he would have to expect to have to take this indefinitely.  He is willing for such a trial and we will change his dose to Eliquis 2.5 mg p.o. twice daily and reassess over the next several months.    The patient underwent additional studies for/4/24 with normal CBC, normal CMP, Factor VIII level down at 167 and D-dimer further reduced to 0.35.  The " patient indicates that he is doing well with her current approach and has had no thrombotic episodes.  We discussed risk factors and had assumed that he been assessed for sleep apnea.  This is never occurred but he has a long history of heavy snoring according to his wife and variable fatigue    Plan:  *Continue Eliquis 2.5 mg twice daily     *Sleep evaluation-Dr. Shon San    *Return in 23 weeks-CBC, CMP, factor VIII level, D-dimer level*    *24 weeks MD

## 2024-04-23 ENCOUNTER — OFFICE VISIT (OUTPATIENT)
Dept: SLEEP MEDICINE | Facility: HOSPITAL | Age: 59
End: 2024-04-23
Payer: COMMERCIAL

## 2024-04-23 VITALS — WEIGHT: 315 LBS | HEIGHT: 75 IN | BODY MASS INDEX: 39.17 KG/M2 | OXYGEN SATURATION: 96 % | HEART RATE: 99 BPM

## 2024-04-23 DIAGNOSIS — G47.30 SLEEP-DISORDERED BREATHING: Primary | ICD-10-CM

## 2024-04-23 PROCEDURE — G0463 HOSPITAL OUTPT CLINIC VISIT: HCPCS

## 2024-04-23 NOTE — PROGRESS NOTES
[facility] New Patient/Consultation       Reason for Consultation: DUDLEY    Patient Care Team:  Lisa Cunningham APRN as PCP - General (Nurse Practitioner)  Gutierrez Retana MD as PCP - Family Medicine  Mikey Michael MD as Consulting Physician (Hematology and Oncology)  Darvin San MD as Consulting Physician (Sleep Medicine)    Chief complaint: Snoring    History of present illness:    Thank you for asking me to see your patient.  The patient is a 59 y.o. male who has a history of unprovoked DVT in 2015.  Subsequently, he has had a second episode which is required indefinite anticoagulation.  Sleep evaluation requested since DUDLEY could be a possible modifiable risk factor.    The patient does report snoring.  He goes to bed at 9:30 PM and gets out of bed at 6:30 AM.  It takes him 30 minutes to fall asleep and he is rested upon arising.  [place] Sleepiness Scale is normal at 3.  Besides snoring, he has no other complaints of witnessed apnea or awaken gasping for breath or awakening coughing or choking.    Review of Systems:    A complete review of systems was done and all were negative with the exception of the above    History:  Past Medical History:   Diagnosis Date    DVT (deep venous thrombosis)     History of arthritis     History of hyperlipidemia     HL (hearing loss)     Hypertension 15 years ago    Obesity long time   ,   Past Surgical History:   Procedure Laterality Date    JOINT REPLACEMENT  2017    KIDNEY SURGERY      TOTAL HIP ARTHROPLASTY Left    ,   Family History   Problem Relation Age of Onset    Diabetes Mother     Hyperlipidemia Mother     Hearing loss Paternal Grandmother     Alcohol abuse Maternal Uncle    , and   Social History     Socioeconomic History    Marital status:      Spouse name: Angelique   Tobacco Use    Smoking status: Former     Types: Cigars, Cigarettes    Smokeless tobacco: Never   Vaping Use    Vaping status: Never Used   Substance and Sexual  "Activity    Alcohol use: Yes     Alcohol/week: 6.0 standard drinks of alcohol     Types: 6 Cans of beer per week     Comment: occasionally    Drug use: Never    Sexual activity: Yes     Partners: Female     Birth control/protection: None     E-cigarette/Vaping    E-cigarette/Vaping Use Never User      E-cigarette/Vaping Substances    Nicotine No     THC No     CBD No     Flavoring No      E-cigarette/Vaping Devices    Disposable No     Pre-filled or Refillable Cartridge No     Refillable Tank No     Pre-filled Pod No       Social History: He is an .  He uses decaf coffee.    Allergies:  Patient has no known allergies.     Medication Review: His list was reviewed    Vital Signs:    Vitals:    04/23/24 0826   Pulse: 99   SpO2: 96%   Weight: (!) 151 kg (332 lb)   Height: 190.5 cm (75\")      Body mass index is 41.5 kg/m².  Neck Circumference: 18 inches      Physical Exam:    Constitutional:  Well developed 59 y.o. male that appears in no apparent distress.  Awake & oriented times 3.  Normal mood with normal recent and remote memory and normal judgement.  Eyes:  Conjunctivae normal.  Oropharynx: Moist mucous membranes without exudate and a large tongue and class III Mallampati airway.    Neck: Trachea midline  Respiratory: Effort is not labored  Cardiovascular: Radial pulse regular  Musculoskeletal: Gait appears normal, no digital clubbing evident, no pre-tibial edema    Results Review: I reviewed the note from Dr. Mikey Michael dated 4/18/2024    Impression:   The patient and his wife report snoring with variable degrees of fatigue at times.  This represents sleep disordered breathing.    STOP-BANG scored 6/8 consistent with high risk of obstructive sleep apnea.    Plan:  Good sleep hygiene measures should be maintained.  Weight loss would be beneficial in this patient who has class III severe obesity by Body mass index is 41.5 kg/m².    Pathophysiology of DUDLEY described to the patient.  Cardiovascular " complications of untreated DUDLEY also reviewed.  I also described how untreated DUDLEY can contribute to hypertension.  Additionally, as previously discussed, I reviewed how untreated DUDLEY can possibly contribute to DVT.    After reviewing all, I would recommend  The patient is agreeable to proceed with a home sleep study.  I described the procedure to him and I answered all of his questions.  Additionally, depending on results of home sleep study and due to the patient's high risk by STOP-BANG, I reviewed therapy with auto CPAP.  Again, I answered all questions.  Therefore, home sleep study will be scheduled and further recommendations will be made once those results are known.    Thank you for requesting me to assist in this patient's care.    Darvin San MD  Sleep Medicine  05/05/24  10:15 EDT

## 2024-05-01 ENCOUNTER — HOSPITAL ENCOUNTER (OUTPATIENT)
Dept: SLEEP MEDICINE | Facility: HOSPITAL | Age: 59
Discharge: HOME | End: 2024-05-01
Admitting: INTERNAL MEDICINE
Payer: COMMERCIAL

## 2024-05-01 DIAGNOSIS — G47.30 SLEEP-DISORDERED BREATHING: ICD-10-CM

## 2024-05-01 PROCEDURE — 95806 SLEEP STUDY UNATT&RESP EFFT: CPT

## 2024-05-05 PROBLEM — G47.30 SLEEP-DISORDERED BREATHING: Status: ACTIVE | Noted: 2024-05-05

## 2024-05-12 DIAGNOSIS — I82.402 ACUTE DEEP VEIN THROMBOSIS (DVT) OF LEFT LOWER EXTREMITY, UNSPECIFIED VEIN: ICD-10-CM

## 2024-05-13 ENCOUNTER — TELEPHONE (OUTPATIENT)
Dept: SLEEP MEDICINE | Facility: HOSPITAL | Age: 59
End: 2024-05-13
Payer: COMMERCIAL

## 2024-05-13 RX ORDER — APIXABAN 2.5 MG/1
2.5 TABLET, FILM COATED ORAL EVERY 12 HOURS
Qty: 60 TABLET | Refills: 3 | Status: SHIPPED | OUTPATIENT
Start: 2024-05-13

## 2024-05-13 NOTE — TELEPHONE ENCOUNTER
Lm on pts vm about results and faxed order to [facility]. Pt will need to schedule a f/u for compliance.

## 2024-07-09 DIAGNOSIS — E78.2 MIXED HYPERLIPIDEMIA: ICD-10-CM

## 2024-07-09 NOTE — TELEPHONE ENCOUNTER
Called to schedule appt for med refill no answer lvm for pt to call when available to schedule     Hub to relay

## 2024-07-16 DIAGNOSIS — I10 ESSENTIAL HYPERTENSION: Chronic | ICD-10-CM

## 2024-07-17 NOTE — TELEPHONE ENCOUNTER
Rx Refill Note  Requested Prescriptions     Pending Prescriptions Disp Refills    amLODIPine-benazepril (LOTREL 5-20) 5-20 MG per capsule [Pharmacy Med Name: amLODIPine-BENAZEPRIL 5-20 MG CAP] 90 capsule 1     Sig: TAKE 1 CAPSULE BY MOUTH DAILY      Last office visit with prescribing clinician: 10/5/2023   Last telemedicine visit with prescribing clinician: Visit date not found   Next office visit with prescribing clinician: 7/26/2024                         Would you like a call back once the refill request has been completed: [] Yes [] No    If the office needs to give you a call back, can they leave a voicemail: [] Yes [] No    Rosario Funes Rep  07/17/24, 14:25 EDT

## 2024-07-18 RX ORDER — AMLODIPINE BESYLATE AND BENAZEPRIL HYDROCHLORIDE 5; 20 MG/1; MG/1
1 CAPSULE ORAL DAILY
Qty: 90 CAPSULE | Refills: 0 | Status: SHIPPED | OUTPATIENT
Start: 2024-07-18

## 2024-07-26 ENCOUNTER — OFFICE VISIT (OUTPATIENT)
Dept: FAMILY MEDICINE CLINIC | Facility: CLINIC | Age: 59
End: 2024-07-26
Payer: COMMERCIAL

## 2024-07-26 VITALS
HEIGHT: 75 IN | WEIGHT: 315 LBS | BODY MASS INDEX: 39.17 KG/M2 | HEART RATE: 81 BPM | SYSTOLIC BLOOD PRESSURE: 119 MMHG | OXYGEN SATURATION: 96 % | RESPIRATION RATE: 14 BRPM | DIASTOLIC BLOOD PRESSURE: 81 MMHG | TEMPERATURE: 97.1 F

## 2024-07-26 DIAGNOSIS — Z12.11 SCREEN FOR COLON CANCER: ICD-10-CM

## 2024-07-26 DIAGNOSIS — Z00.00 ROUTINE GENERAL MEDICAL EXAMINATION AT A HEALTH CARE FACILITY: Primary | ICD-10-CM

## 2024-07-26 DIAGNOSIS — I82.409 RECURRENT DEEP VEIN THROMBOSIS (DVT): ICD-10-CM

## 2024-07-26 DIAGNOSIS — E78.2 MIXED HYPERLIPIDEMIA: ICD-10-CM

## 2024-07-26 DIAGNOSIS — G47.33 OSA ON CPAP: ICD-10-CM

## 2024-07-26 DIAGNOSIS — Z79.01 ANTICOAGULATED: ICD-10-CM

## 2024-07-26 DIAGNOSIS — I10 ESSENTIAL HYPERTENSION: ICD-10-CM

## 2024-07-26 PROCEDURE — 90715 TDAP VACCINE 7 YRS/> IM: CPT | Performed by: NURSE PRACTITIONER

## 2024-07-26 PROCEDURE — 90471 IMMUNIZATION ADMIN: CPT | Performed by: NURSE PRACTITIONER

## 2024-07-26 PROCEDURE — 99396 PREV VISIT EST AGE 40-64: CPT | Performed by: NURSE PRACTITIONER

## 2024-07-26 RX ORDER — PRAVASTATIN SODIUM 20 MG
20 TABLET ORAL DAILY
Qty: 90 TABLET | Refills: 1 | Status: SHIPPED | OUTPATIENT
Start: 2024-07-26

## 2024-07-26 RX ORDER — PRAVASTATIN SODIUM 20 MG
20 TABLET ORAL DAILY
Qty: 90 TABLET | Refills: 1 | OUTPATIENT
Start: 2024-07-26

## 2024-07-26 NOTE — PROGRESS NOTES
"Chief Complaint  Med Refill    Subjective        Kamran Gongora presents to [facility]  History of Present Illness    History of Present Illness  The patient presents for physical and refills on medications for hypertension and hyperlipidemia.    He is seeking a refill of his cholesterol medication, which he reports is working well for him. He is unsure of his next appointment with Dr. Michael for anticoagulation which is he on chronic for unprovoked DVTs. He has not yet undergone a colonoscopy due to personal commitments, but plans to schedule one with his wife. He denies exposure to allergens such as insect bites or animals. He denies experiencing shortness of breath, cough, chest pain, or heart palpitations. He also denies any gastrointestinal issues, such as nausea, vomiting, or diarrhea. He experiences acid reflux and heartburn only when he consumes certain foods. He denies any blood in his stool or difficulty swallowing. He has a hearing issue and regularly visits a dentist. He uses a CPAP machine for sleep, but finds it causes more problems than effective. He denies any urinary problems, including blood in his urine, erectile dysfunction, or loss of libido. He denies any unexpected muscle aches or pains, headaches, dizziness, or skin issues.      IMMUNIZATIONS  He has only 1 vaccine. He has not had a tetanus vaccine.       Objective   Vital Signs:  /81   Pulse 81   Temp 97.1 °F (36.2 °C) (Oral)   Resp 14   Ht 190.5 cm (75\")   Wt (!) 152 kg (335 lb)   SpO2 96%   BMI 41.87 kg/m²   Estimated body mass index is 41.87 kg/m² as calculated from the following:    Height as of this encounter: 190.5 cm (75\").    Weight as of this encounter: 152 kg (335 lb).       Class 3 Severe Obesity (BMI >=40). Obesity-related health conditions include the following: obstructive sleep apnea, hypertension, and dyslipidemias. Obesity is unchanged. BMI is is above average; BMI management " plan is completed. We discussed portion control, increasing exercise, and Information on healthy weight added to patient's after visit summary.      Physical Exam  Vitals and nursing note reviewed.   Constitutional:       General: He is not in acute distress.     Appearance: He is well-developed. He is obese. He is not ill-appearing.   HENT:      Head: Normocephalic and atraumatic.      Right Ear: Tympanic membrane, ear canal and external ear normal.      Left Ear: Tympanic membrane, ear canal and external ear normal.      Mouth/Throat:      Mouth: Mucous membranes are moist.      Pharynx: Uvula midline. No posterior oropharyngeal erythema.   Eyes:      General: No scleral icterus.        Right eye: No discharge.         Left eye: No discharge.      Conjunctiva/sclera: Conjunctivae normal.   Neck:      Thyroid: No thyromegaly.   Cardiovascular:      Rate and Rhythm: Normal rate and regular rhythm.      Heart sounds: Normal heart sounds. No murmur heard.  Pulmonary:      Effort: Pulmonary effort is normal.      Breath sounds: Normal breath sounds.   Abdominal:      General: Bowel sounds are normal. There is no distension.      Palpations: Abdomen is soft. There is no mass.      Tenderness: There is no abdominal tenderness.   Musculoskeletal:         General: No deformity.      Cervical back: Neck supple.      Comments: Gait smooth and steady   Lymphadenopathy:      Cervical: No cervical adenopathy.   Skin:     General: Skin is warm and dry.      Findings: No bruising.   Neurological:      General: No focal deficit present.      Mental Status: He is alert and oriented to person, place, and time.   Psychiatric:         Mood and Affect: Mood normal.         Behavior: Behavior normal.          Physical Exam       Result Review :            Results                  Assessment and Plan     Diagnoses and all orders for this visit:    1. Routine general medical examination at a health care facility (Primary)  -      Comprehensive Metabolic Panel  -     Hemoglobin A1c  -     Lipid Panel With LDL / HDL Ratio  -     Microalbumin / Creatinine Urine Ratio - Urine, Clean Catch  -     TSH Rfx On Abnormal To Free T4  -     PSA DIAGNOSTIC ONLY    2. Essential hypertension    3. Mixed hyperlipidemia  -     pravastatin (Pravachol) 20 MG tablet; Take 1 tablet by mouth Daily.  Dispense: 90 tablet; Refill: 1    4. DUDLEY on CPAP    5. Recurrent deep vein thrombosis (DVT)    6. Anticoagulated    7. Screen for colon cancer  -     Ambulatory Referral For Screening Colonoscopy    Other orders  -     Tdap Vaccine => 8yo IM (BOOSTRIX/ADACEL)        Assessment & Plan    Fasting labs have been ordered. A referral for a colonoscopy has been provided. A tetanus vaccine was administered today.    Appropriate health maintenance and prevention topics specific for this patient were discussed today.  Additionally, health goals, and health concerns addressed as appropriate.  Pt was encouraged to stay up to date on recommended screenings and vaccines based on USPSTF guidelines.     Hyperlipidemia  Prescriptions for cholesterol medication provided today.  Any adjustments will follow labs    Hypertension  Stable.  Check labs including micro and will continue current medications.  Blood pressure looks good today.    DUDLEY on CPAP  Discussed importance of management of DUDLEY for heart health and weight management.    Chronic anticoagulation for unprovoked recurrent DVT without evidence of excessive bleeding or bruising.  This is managed by hematology.    Assessment & Plan      Follow-up  A follow-up visit is scheduled for 6 months from now.            Follow Up     No follow-ups on file.  Patient was given instructions and counseling regarding his condition or for health maintenance advice. Please see specific information pulled into the AVS if appropriate.    Patient or patient representative verbalized consent for the use of Ambient Listening during the visit with   JULIO C Shetty for chart documentation. 8/9/2024  07:34 EDT

## 2024-08-16 ENCOUNTER — LAB (OUTPATIENT)
Dept: LAB | Facility: HOSPITAL | Age: 59
End: 2024-08-16
Payer: COMMERCIAL

## 2024-08-16 LAB
ALBUMIN SERPL-MCNC: 4 G/DL (ref 3.5–5.2)
ALBUMIN UR-MCNC: <1.2 MG/DL
ALBUMIN/GLOB SERPL: 1.2 G/DL
ALP SERPL-CCNC: 100 U/L (ref 39–117)
ALT SERPL W P-5'-P-CCNC: 14 U/L (ref 1–41)
ANION GAP SERPL CALCULATED.3IONS-SCNC: 9.6 MMOL/L (ref 5–15)
AST SERPL-CCNC: 20 U/L (ref 1–40)
BILIRUB SERPL-MCNC: 0.6 MG/DL (ref 0–1.2)
BUN SERPL-MCNC: 16 MG/DL (ref 6–20)
BUN/CREAT SERPL: 15.5 (ref 7–25)
CALCIUM SPEC-SCNC: 9.6 MG/DL (ref 8.6–10.5)
CHLORIDE SERPL-SCNC: 103 MMOL/L (ref 98–107)
CHOLEST SERPL-MCNC: 157 MG/DL (ref 0–200)
CO2 SERPL-SCNC: 26.4 MMOL/L (ref 22–29)
CREAT SERPL-MCNC: 1.03 MG/DL (ref 0.76–1.27)
CREAT UR-MCNC: 87.3 MG/DL
EGFRCR SERPLBLD CKD-EPI 2021: 84.2 ML/MIN/1.73
GLOBULIN UR ELPH-MCNC: 3.4 GM/DL
GLUCOSE SERPL-MCNC: 112 MG/DL (ref 65–99)
HBA1C MFR BLD: 5.8 % (ref 4.8–5.6)
HDLC SERPL-MCNC: 45 MG/DL (ref 40–60)
LDLC SERPL CALC-MCNC: 94 MG/DL (ref 0–100)
LDLC/HDLC SERPL: 2.07 {RATIO}
MICROALBUMIN/CREAT UR: NORMAL MG/G{CREAT}
POTASSIUM SERPL-SCNC: 4.4 MMOL/L (ref 3.5–5.2)
PROT SERPL-MCNC: 7.4 G/DL (ref 6–8.5)
PSA SERPL-MCNC: 2.34 NG/ML (ref 0–4)
SODIUM SERPL-SCNC: 139 MMOL/L (ref 136–145)
TRIGL SERPL-MCNC: 94 MG/DL (ref 0–150)
TSH SERPL DL<=0.05 MIU/L-ACNC: 2.39 UIU/ML (ref 0.27–4.2)
VLDLC SERPL-MCNC: 18 MG/DL (ref 5–40)

## 2024-08-16 PROCEDURE — 84153 ASSAY OF PSA TOTAL: CPT | Performed by: NURSE PRACTITIONER

## 2024-08-16 PROCEDURE — 83036 HEMOGLOBIN GLYCOSYLATED A1C: CPT | Performed by: NURSE PRACTITIONER

## 2024-08-16 PROCEDURE — 80053 COMPREHEN METABOLIC PANEL: CPT | Performed by: NURSE PRACTITIONER

## 2024-08-16 PROCEDURE — 82043 UR ALBUMIN QUANTITATIVE: CPT | Performed by: NURSE PRACTITIONER

## 2024-08-16 PROCEDURE — 80061 LIPID PANEL: CPT | Performed by: NURSE PRACTITIONER

## 2024-08-16 PROCEDURE — 82570 ASSAY OF URINE CREATININE: CPT | Performed by: NURSE PRACTITIONER

## 2024-08-16 PROCEDURE — 84443 ASSAY THYROID STIM HORMONE: CPT | Performed by: NURSE PRACTITIONER

## 2024-09-17 DIAGNOSIS — I82.402 ACUTE DEEP VEIN THROMBOSIS (DVT) OF LEFT LOWER EXTREMITY, UNSPECIFIED VEIN: ICD-10-CM

## 2024-09-17 RX ORDER — APIXABAN 2.5 MG/1
2.5 TABLET, FILM COATED ORAL EVERY 12 HOURS
Qty: 60 TABLET | Refills: 11 | Status: SHIPPED | OUTPATIENT
Start: 2024-09-17

## 2024-10-03 ENCOUNTER — LAB (OUTPATIENT)
Dept: LAB | Facility: HOSPITAL | Age: 59
End: 2024-10-03
Payer: COMMERCIAL

## 2024-10-03 DIAGNOSIS — I82.402 ACUTE DEEP VEIN THROMBOSIS (DVT) OF LEFT LOWER EXTREMITY, UNSPECIFIED VEIN: ICD-10-CM

## 2024-10-03 LAB
ALBUMIN SERPL-MCNC: 4.2 G/DL (ref 3.5–5.2)
ALBUMIN/GLOB SERPL: 1.5 G/DL
ALP SERPL-CCNC: 94 U/L (ref 39–117)
ALT SERPL W P-5'-P-CCNC: 14 U/L (ref 1–41)
ANION GAP SERPL CALCULATED.3IONS-SCNC: 11.7 MMOL/L (ref 5–15)
AST SERPL-CCNC: 19 U/L (ref 1–40)
BASOPHILS # BLD AUTO: 0.07 10*3/MM3 (ref 0–0.2)
BASOPHILS NFR BLD AUTO: 1 % (ref 0–1.5)
BILIRUB SERPL-MCNC: 0.4 MG/DL (ref 0–1.2)
BUN SERPL-MCNC: 16 MG/DL (ref 6–20)
BUN/CREAT SERPL: 16 (ref 7–25)
CALCIUM SPEC-SCNC: 9.3 MG/DL (ref 8.6–10.5)
CHLORIDE SERPL-SCNC: 105 MMOL/L (ref 98–107)
CO2 SERPL-SCNC: 23.3 MMOL/L (ref 22–29)
CREAT SERPL-MCNC: 1 MG/DL (ref 0.76–1.27)
D DIMER PPP FEU-MCNC: 0.37 MCGFEU/ML (ref 0–0.58)
DEPRECATED RDW RBC AUTO: 44.3 FL (ref 37–54)
EGFRCR SERPLBLD CKD-EPI 2021: 87.2 ML/MIN/1.73
EOSINOPHIL # BLD AUTO: 0.15 10*3/MM3 (ref 0–0.4)
EOSINOPHIL NFR BLD AUTO: 2.2 % (ref 0.3–6.2)
ERYTHROCYTE [DISTWIDTH] IN BLOOD BY AUTOMATED COUNT: 12.8 % (ref 12.3–15.4)
GLOBULIN UR ELPH-MCNC: 2.8 GM/DL
GLUCOSE SERPL-MCNC: 133 MG/DL (ref 65–99)
HCT VFR BLD AUTO: 48.7 % (ref 37.5–51)
HGB BLD-MCNC: 15.8 G/DL (ref 13–17.7)
IMM GRANULOCYTES # BLD AUTO: 0.02 10*3/MM3 (ref 0–0.05)
IMM GRANULOCYTES NFR BLD AUTO: 0.3 % (ref 0–0.5)
LYMPHOCYTES # BLD AUTO: 1.58 10*3/MM3 (ref 0.7–3.1)
LYMPHOCYTES NFR BLD AUTO: 23.6 % (ref 19.6–45.3)
MCH RBC QN AUTO: 30.6 PG (ref 26.6–33)
MCHC RBC AUTO-ENTMCNC: 32.4 G/DL (ref 31.5–35.7)
MCV RBC AUTO: 94.4 FL (ref 79–97)
MONOCYTES # BLD AUTO: 0.59 10*3/MM3 (ref 0.1–0.9)
MONOCYTES NFR BLD AUTO: 8.8 % (ref 5–12)
NEUTROPHILS NFR BLD AUTO: 4.28 10*3/MM3 (ref 1.7–7)
NEUTROPHILS NFR BLD AUTO: 64.1 % (ref 42.7–76)
NRBC BLD AUTO-RTO: 0 /100 WBC (ref 0–0.2)
PLATELET # BLD AUTO: 294 10*3/MM3 (ref 140–450)
PMV BLD AUTO: 9.9 FL (ref 6–12)
POTASSIUM SERPL-SCNC: 4.4 MMOL/L (ref 3.5–5.2)
PROT SERPL-MCNC: 7 G/DL (ref 6–8.5)
RBC # BLD AUTO: 5.16 10*6/MM3 (ref 4.14–5.8)
SODIUM SERPL-SCNC: 140 MMOL/L (ref 136–145)
WBC NRBC COR # BLD AUTO: 6.69 10*3/MM3 (ref 3.4–10.8)

## 2024-10-03 PROCEDURE — 36415 COLL VENOUS BLD VENIPUNCTURE: CPT

## 2024-10-03 PROCEDURE — 85240 CLOT FACTOR VIII AHG 1 STAGE: CPT | Performed by: INTERNAL MEDICINE

## 2024-10-03 PROCEDURE — 85379 FIBRIN DEGRADATION QUANT: CPT | Performed by: INTERNAL MEDICINE

## 2024-10-03 PROCEDURE — 80053 COMPREHEN METABOLIC PANEL: CPT

## 2024-10-03 PROCEDURE — 85025 COMPLETE CBC W/AUTO DIFF WBC: CPT

## 2024-10-04 LAB
FACT VIII ACT/NOR PPP: 198 % ACTIVITY (ref 60–150)
Lab: ABNORMAL

## 2024-10-10 ENCOUNTER — OFFICE VISIT (OUTPATIENT)
Dept: ONCOLOGY | Facility: CLINIC | Age: 59
End: 2024-10-10
Payer: COMMERCIAL

## 2024-10-10 VITALS
HEART RATE: 70 BPM | SYSTOLIC BLOOD PRESSURE: 118 MMHG | WEIGHT: 315 LBS | DIASTOLIC BLOOD PRESSURE: 82 MMHG | HEIGHT: 75 IN | RESPIRATION RATE: 16 BRPM | BODY MASS INDEX: 39.17 KG/M2 | OXYGEN SATURATION: 98 % | TEMPERATURE: 97.8 F

## 2024-10-10 DIAGNOSIS — Z86.718 HISTORY OF DVT (DEEP VEIN THROMBOSIS): Primary | ICD-10-CM

## 2024-10-10 NOTE — PROGRESS NOTES
REASON FOR FOLLOWUP: Recurrent DVT      History of Present Illness   The patient is a 59-year-old followed with below medical disorders who had presented to [facility] 9/13/2023 with left lower extremity pain.  He had a previous history of DVT in the same extremity and upon reevaluation was found to have acute occlusive DVT in the distal femoral, popliteal gastrocnemius, posterior tibial and peroneal veins.    The patient was heparinized with D-dimer found to be 4925, homocystine at 9.0 negative lupus anticoagulant, AT III and was transitioned to Eliquis on discharge.  He was seen by his primary care-nurse practitioner-10/5/2023 with a history that included previous anticoagulation for 3 months-3/24/2015.  He more recently has been working at the [email or web address] for prolonged periods but moved to stand periodically and walks most evenings with his wife, stays well-hydrated.  He is status post left hip replacement 2017.    We are asked to see the patient for his recurrent DVT with the previous DVT also unprovoked.  He is now referred for general assessment including length of anticoagulation.  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  There is no family history, additionally, a hypercoagulable state or any member having DVT, PE or pregnancy loss.    Plans were made to reevaluate further with holding Eliquis and having ongoing testing obtained.  This was obtained 12/11 with negative findings for lupus anticoagulant, but homocystine 10.6, factor V Leiden normal, prothrombin gene mutation normal D-dimer at 0.58, Factor VIII level of 203    The patient is seen 1/4/2024 indicating that his extremity-left lower extremity-is no longer painful and these return to his usual activities.  We have discussed that his findings would indicate that he could reduce his Eliquis dose to the  prophylactic level at this point but he would have to expect to have to take this indefinitely.  He is willing for such a trial and we will change his dose to Eliquis 2.5 mg p.o. twice daily and reassess over the next several months.    The patient underwent additional studies for/4/24 with normal CBC, normal CMP, Factor VIII level down at 167 and D-dimer further reduced to 0.35.  The patient indicates that he is doing well with her current approach and has had no thrombotic episodes.  We discussed risk factors and had assumed that he been assessed for sleep apnea.  This is never occurred but he has a long history of heavy snoring according to his wife and variable fatigue.    The patient was referred and underwent a sleep assessment with Dr. Darvin San.  After study he was found to have sleep apnea-high risk by STOP-BANG.  Therapy with auto CPAP was initiated and the patient is currently using this to better affect and improving his sleep.  He continues anticoagulation tolerating it well when seen 10/10/2024.  Studies including normal H&H of 15.8 and 48.7, CMP with normal findings except for glucose of 133, factor VIII level 198 and quantitative D-dimer at 0.37.    Past Medical History:   Diagnosis Date    DVT (deep venous thrombosis)     History of arthritis     History of hyperlipidemia     HL (hearing loss)     Hypertension 15 years ago    Obesity long time        Past Surgical History:   Procedure Laterality Date    JOINT REPLACEMENT  2017    KIDNEY SURGERY      TOTAL HIP ARTHROPLASTY Left         Current Outpatient Medications on File Prior to Visit   Medication Sig Dispense Refill    amLODIPine-benazepril (LOTREL 5-20) 5-20 MG per capsule TAKE 1 CAPSULE BY MOUTH DAILY 90 capsule 0    apixaban (Eliquis) 2.5 MG tablet tablet TAKE ONE TABLET BY MOUTH EVERY 12 HOURS 60 tablet 11    Calcium Citrate-Vitamin D3 (CITRACAL) 315-6.25 MG-MCG tablet tablet Take 1 tablet by mouth Daily.      ELDERBERRY PO Take  by  "mouth.      multivitamin (MULTI VITAMIN DAILY PO) Take  by mouth Daily.      pravastatin (Pravachol) 20 MG tablet Take 1 tablet by mouth Daily. 90 tablet 1    vitamin B-12 (CYANOCOBALAMIN) 100 MCG tablet Take 1 tablet by mouth Daily.      vitamin C (ASCORBIC ACID) 250 MG tablet Take 1 tablet by mouth Daily.      VITAMIN D PO Take  by mouth.       No current facility-administered medications on file prior to visit.        ALLERGIES:  No Known Allergies     Social History     Socioeconomic History    Marital status:      Spouse name: Angelique   Tobacco Use    Smoking status: Former     Types: Cigars, Cigarettes    Smokeless tobacco: Never   Vaping Use    Vaping status: Never Used   Substance and Sexual Activity    Alcohol use: Yes     Alcohol/week: 6.0 standard drinks of alcohol     Types: 6 Cans of beer per week     Comment: occasionally    Drug use: Never    Sexual activity: Yes     Partners: Female     Birth control/protection: None        Family History   Problem Relation Age of Onset    Diabetes Mother     Hyperlipidemia Mother     Hearing loss Paternal Grandmother     Alcohol abuse Maternal Uncle         Review of Systems   Constitutional:  Negative for activity change, diaphoresis, fatigue, fever and unexpected weight change.   HENT: Negative.     Eyes: Negative.    Respiratory: Negative.     Cardiovascular: Negative.    Gastrointestinal: Negative.    Musculoskeletal: Negative.    Allergic/Immunologic: Negative.    Neurological: Negative.    Hematological: Negative.    Psychiatric/Behavioral:  Positive for sleep disturbance.         Objective     Vitals:    10/10/24 1344   BP: 118/82   Pulse: 70   Resp: 16   Temp: 97.8 °F (36.6 °C)   TempSrc: Oral   SpO2: 98%   Weight: (!) 152 kg (335 lb 14.4 oz)   Height: 190.5 cm (75\")   PainSc: 0-No pain               10/10/2024     1:44 PM   Current Status   ECOG score 0       Physical Exam  Constitutional:       Appearance: He is obese.   HENT:      Head: Normocephalic " and atraumatic.      Comments: bilateral hearing aids     Nose: Nose normal.      Mouth/Throat:      Mouth: Mucous membranes are moist.      Pharynx: Oropharynx is clear.   Eyes:      Extraocular Movements: Extraocular movements intact.      Conjunctiva/sclera: Conjunctivae normal.      Pupils: Pupils are equal, round, and reactive to light.   Cardiovascular:      Rate and Rhythm: Normal rate and regular rhythm.      Pulses: Normal pulses.      Heart sounds: Normal heart sounds.   Pulmonary:      Effort: Pulmonary effort is normal.      Breath sounds: Normal breath sounds.   Abdominal:      General: Bowel sounds are normal.      Palpations: Abdomen is soft.   Musculoskeletal:         General: Normal range of motion.      Cervical back: Normal range of motion and neck supple.   Skin:     General: Skin is warm and dry.   Neurological:      General: No focal deficit present.      Mental Status: He is oriented to person, place, and time.   Psychiatric:         Mood and Affect: Mood normal.         Behavior: Behavior normal.           RECENT LABS:  Hematology WBC   Date Value Ref Range Status   10/03/2024 6.69 3.40 - 10.80 10*3/mm3 Final   09/13/2023 11.20 (H) 4.5 - 11.0 10*3/uL Final     RBC   Date Value Ref Range Status   10/03/2024 5.16 4.14 - 5.80 10*6/mm3 Final   09/13/2023 4.82 4.5 - 5.9 10*6/uL Final     Hemoglobin   Date Value Ref Range Status   10/03/2024 15.8 13.0 - 17.7 g/dL Final   09/13/2023 14.6 13.5 - 17.5 g/dL Final     Hematocrit   Date Value Ref Range Status   10/03/2024 48.7 37.5 - 51.0 % Final   09/13/2023 44.8 41.0 - 53.0 % Final     Platelets   Date Value Ref Range Status   10/03/2024 294 140 - 450 10*3/mm3 Final   09/13/2023 232 140 - 440 10*3/uL Final          Assessment & Plan      59-year-old male with history of obesity, arthritis, hyperlipidemia and a previous DVT in 2015 that was apparently unprovoked and treated with 3 months of anticoagulation.  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      He does realize he will have to stay in anticoagulation indefinitely as result of the second episode of DVT.  We discussed an assessment for hypercoagulable state but recognize he will have to hold his Eliquis for 12 to 24 hours before this is drawn so as to not affect the results.    Plans were made to reevaluate further with holding Eliquis and having ongoing testing obtained.  This was obtained 12/11 with negative findings for lupus anticoagulant, but homocystine 10.6, factor V Leiden normal, prothrombin gene mutation normal D-dimer at 0.58, Factor VIII level of 203.    The patient is seen 1/4/2024 and we discussed the findings suggesting evidence of residual thrombosis with slightly elevated D-dimer as well as increased risk with modestly elevated Factor VIII level and the need to continue anticoagulation.    We have discussed that his findings would indicate that he could reduce his Eliquis dose to the prophylactic level at this point but he would have to expect to have to take this indefinitely.  He is willing for such a trial and we will change his dose to Eliquis 2.5 mg p.o. twice daily and reassess over the next several months.    The patient underwent additional studies for/4/24 with normal CBC, normal CMP, Factor VIII level down at 167 and D-dimer further reduced to 0.35.  The patient indicates that he is doing well with her current approach and has had no thrombotic episodes.  We discussed risk factors and had assumed " that he been assessed for sleep apnea.  This is never occurred but he has a long history of heavy snoring according to his wife and variable   Fatigue.    The patient was referred and underwent a sleep assessment with Dr. Darvin San.  After study he was found to have sleep apnea-high risk by STOP-BANG.  Therapy with auto CPAP was initiated and the patient is currently using this to better affect and improving his sleep.  He continues anticoagulation tolerating it well when seen 10/10/2024.  Studies including normal H&H of 15.8 and 48.7, CMP with normal findings except for glucose of 133, factor VIII level 198 and quantitative D-dimer at 0.37.      Plan:  *Continue Eliquis 2.5 mg twice daily     *Sleep evaluation-Dr. Shon San    *Return in 51 weeks-CBC, CMP, factor VIII level, D-dimer level*    *52 weeks MD

## 2024-10-22 ENCOUNTER — TELEPHONE (OUTPATIENT)
Dept: SLEEP MEDICINE | Facility: HOSPITAL | Age: 59
End: 2024-10-22
Payer: COMMERCIAL

## 2024-10-22 ENCOUNTER — OFFICE VISIT (OUTPATIENT)
Dept: SLEEP MEDICINE | Facility: HOSPITAL | Age: 59
End: 2024-10-22
Payer: COMMERCIAL

## 2024-10-22 VITALS — HEIGHT: 75 IN | OXYGEN SATURATION: 97 % | HEART RATE: 86 BPM | BODY MASS INDEX: 39.17 KG/M2 | WEIGHT: 315 LBS

## 2024-10-22 DIAGNOSIS — G47.36 HYPOXEMIA ASSOCIATED WITH SLEEP: ICD-10-CM

## 2024-10-22 DIAGNOSIS — E66.01 CLASS 3 SEVERE OBESITY DUE TO EXCESS CALORIES WITH SERIOUS COMORBIDITY AND BODY MASS INDEX (BMI) OF 40.0 TO 44.9 IN ADULT: ICD-10-CM

## 2024-10-22 DIAGNOSIS — G47.33 OSA ON CPAP: Primary | ICD-10-CM

## 2024-10-22 DIAGNOSIS — E66.813 CLASS 3 SEVERE OBESITY DUE TO EXCESS CALORIES WITH SERIOUS COMORBIDITY AND BODY MASS INDEX (BMI) OF 40.0 TO 44.9 IN ADULT: ICD-10-CM

## 2024-10-22 PROCEDURE — 99213 OFFICE O/P EST LOW 20 MIN: CPT | Performed by: INTERNAL MEDICINE

## 2024-10-22 PROCEDURE — G0463 HOSPITAL OUTPT CLINIC VISIT: HCPCS

## 2024-10-22 NOTE — PROGRESS NOTES
"Follow Up Sleep Disorders Center Note     Chief Complaint:  DUDLEY     Primary Care Physician: Lisa Cunningham APRN    Interval History:   The patient is a 59 y.o. male who I last saw 4/23/2024 and that note was reviewed.  Home sleep study performed 5/1/2024.  Severe DUDLEY with sleep-related hypoxia identified.  Auto CPAP initiated and the patient is here today for follow-up.  He states he is improved.  He goes to bed at 10 PM and gets out of bed at 5 AM.  He does not snore anymore with auto CPAP.  Initially had a dry mouth but that has improved.    Review of Systems:    A complete review of systems was done and all were negative with the exception of the above    Social History:    Social History     Socioeconomic History    Marital status:      Spouse name: Angelique   Tobacco Use    Smoking status: Former     Types: Cigars, Cigarettes    Smokeless tobacco: Never   Vaping Use    Vaping status: Never Used   Substance and Sexual Activity    Alcohol use: Yes     Alcohol/week: 6.0 standard drinks of alcohol     Types: 6 Cans of beer per week     Comment: occasionally    Drug use: Never    Sexual activity: Yes     Partners: Female     Birth control/protection: None       Allergies:  Patient has no known allergies.     Medication Review: His list was reviewed.      Vital Signs:    Vitals:    10/22/24 0900   Pulse: 86   SpO2: 97%   Weight: (!) 151 kg (332 lb)   Height: 190.5 cm (75\")     Body mass index is 41.5 kg/m².    Physical Exam:    Constitutional:  Well developed 59 y.o. male that appears in no apparent distress.  Awake & oriented times 3.  Normal mood with normal recent and remote memory and normal judgement.  Eyes:  Conjunctivae normal.  Oropharynx: Previously, moist mucous membranes without exudate and a large tongue and class III Mallampati airway.    Self-administered [place] Sleepiness Scale test results: 1, previously 3  0-5 Lower normal daytime sleepiness  6-10 Higher normal daytime sleepiness  11-12 Mild, " 13-15 Moderate, & 16-24 Severe excessive daytime sleepiness     Downloaded PAP Data Evaluated For Therapeutic Response and Compliance:  DME is [facility] and the patient uses a nasal mask.  Downloads between 7/23 and 10/20/2024 compliance 93%.  Average usage is 5 hours and 46 minutes.  Average AHI is mildly abnormal at 9.1 and he does have a borderline leak.  Average auto CPAP pressure is 13.7 and his ResMed auto CPAP is 8-20    The patient reports that the 6 days that he was unable to use his device was due to his strap being broken and getting a replacement from his DME    Impression:   Severe obstructive sleep apnea with sleep-related hypoxia by home sleep study 5/1/2024, weight 332 pounds, nearly adequately treated with ResMed auto CPAP.  Downloads demonstrate the patient to be at goal with good compliance and usage.  The patient has no significant complaints of hypersomnolence.    Plan:  Good sleep hygiene measures should be maintained.  Weight loss would be beneficial in this patient who has class III severe obesity by Body mass index is 41.5 kg/m².      After evaluating the patient and assessing results available, the patient is benefiting from the treatment being provided.     The patient will continue ResMed auto CPAP.  Potential side effects of not using PAP therapy reviewed and addressed as needed.  After clinical evaluation and review of downloads, I recommend changes to the patient's pressures.  Based on downloads, auto CPAP will be changed 8-14 cm water pressure.  A new prescription will be sent to the patient's DME as needed.    I answered all of the patient's questions.  The patient will call the Sleep Disorder Center for any problems and will follow up in 4-5 months.      Darvin San MD  Sleep Medicine  10/22/24  09:18 EDT

## 2024-10-26 PROBLEM — G47.30 SLEEP-DISORDERED BREATHING: Status: RESOLVED | Noted: 2024-05-05 | Resolved: 2024-10-26

## 2024-10-26 PROBLEM — E66.01 CLASS 3 SEVERE OBESITY DUE TO EXCESS CALORIES WITH SERIOUS COMORBIDITY AND BODY MASS INDEX (BMI) OF 40.0 TO 44.9 IN ADULT: Status: ACTIVE | Noted: 2024-10-26

## 2024-10-26 PROBLEM — G47.33 OSA ON CPAP: Status: ACTIVE | Noted: 2024-05-01

## 2024-10-26 PROBLEM — E66.813 CLASS 3 SEVERE OBESITY DUE TO EXCESS CALORIES WITH SERIOUS COMORBIDITY AND BODY MASS INDEX (BMI) OF 40.0 TO 44.9 IN ADULT: Status: ACTIVE | Noted: 2024-10-26

## 2024-10-26 PROBLEM — G47.36 HYPOXEMIA ASSOCIATED WITH SLEEP: Status: ACTIVE | Noted: 2024-10-26

## 2025-01-09 DIAGNOSIS — I10 ESSENTIAL HYPERTENSION: Chronic | ICD-10-CM

## 2025-01-09 NOTE — TELEPHONE ENCOUNTER
Rx Refill Note  Requested Prescriptions     Pending Prescriptions Disp Refills    amLODIPine-benazepril (LOTREL 5-20) 5-20 MG per capsule [Pharmacy Med Name: amLODIPine-BENAZEPRIL 5-20 MG CAP] 90 capsule 1     Sig: TAKE 1 CAPSULE BY MOUTH DAILY      Last office visit with prescribing clinician: 7/26/2024   Last telemedicine visit with prescribing clinician: Visit date not found   Next office visit with prescribing clinician: 1/14/2025                         Would you like a call back once the refill request has been completed: [] Yes [] No    If the office needs to give you a call back, can they leave a voicemail: [] Yes [] No    Rosario Funes Rep  01/09/25, 09:46 EST

## 2025-01-10 RX ORDER — AMLODIPINE AND BENAZEPRIL HYDROCHLORIDE 5; 20 MG/1; MG/1
1 CAPSULE ORAL DAILY
Qty: 90 CAPSULE | Refills: 0 | Status: SHIPPED | OUTPATIENT
Start: 2025-01-10

## 2025-01-14 ENCOUNTER — OFFICE VISIT (OUTPATIENT)
Dept: FAMILY MEDICINE CLINIC | Facility: CLINIC | Age: 60
End: 2025-01-14
Payer: COMMERCIAL

## 2025-01-14 VITALS
HEIGHT: 75 IN | TEMPERATURE: 97.5 F | DIASTOLIC BLOOD PRESSURE: 94 MMHG | OXYGEN SATURATION: 100 % | SYSTOLIC BLOOD PRESSURE: 143 MMHG | BODY MASS INDEX: 39.17 KG/M2 | WEIGHT: 315 LBS | HEART RATE: 68 BPM

## 2025-01-14 DIAGNOSIS — G47.33 OSA ON CPAP: Chronic | ICD-10-CM

## 2025-01-14 DIAGNOSIS — I10 ESSENTIAL HYPERTENSION: Primary | Chronic | ICD-10-CM

## 2025-01-14 DIAGNOSIS — E78.2 MIXED HYPERLIPIDEMIA: Chronic | ICD-10-CM

## 2025-01-14 PROCEDURE — 99214 OFFICE O/P EST MOD 30 MIN: CPT | Performed by: NURSE PRACTITIONER

## 2025-01-14 RX ORDER — PRAVASTATIN SODIUM 20 MG
20 TABLET ORAL DAILY
Qty: 90 TABLET | Refills: 1 | Status: SHIPPED | OUTPATIENT
Start: 2025-01-14

## 2025-01-14 RX ORDER — AMLODIPINE AND BENAZEPRIL HYDROCHLORIDE 5; 20 MG/1; MG/1
1 CAPSULE ORAL DAILY
Qty: 90 CAPSULE | Refills: 1 | Status: CANCELLED | OUTPATIENT
Start: 2025-01-14

## 2025-01-28 PROBLEM — E78.2 MIXED HYPERLIPIDEMIA: Chronic | Status: ACTIVE | Noted: 2025-01-28

## 2025-01-28 NOTE — PROGRESS NOTES
"Chief Complaint  Hypertension    Subjective        Kamran Gongora presents to [facility]  Hypertension        History of Present Illness  The patient presents for evaluation of hypertension, hyperlipidemia, and health maintenance.    He does not monitor his blood pressure at home, but his wife has a machine. He is currently on a regimen of amlodipine-benazepril, administered in the morning. He reports no adverse effects such as coughing from medication. He reports no episodes of bruising or bleeding while on anticoagulant therapy. He also reports no symptoms of headaches, dizziness, or chest pain. He has experienced a slight weight gain over the past month. He reports no presence of blood in his stool or any gastrointestinal issues. He also reports no lower extremity edema and utilizes compression socks.    He is on pravastatin for cholesterol management. His last lipid panel was conducted in 08/2024, with results within normal limits.    He is currently using a CPAP machine for sleep apnea management. He has not yet completed the necessary paperwork for his colonoscopy due to personal circumstances but plans to do so with his wife's assistance.    FAMILY HISTORY  His brother has cancer and is undergoing radiation treatment.    MEDICATIONS  Amlodipine-benazepril, pravastatin, Eliquis       Objective   Vital Signs:  /94   Pulse 68   Temp 97.5 °F (36.4 °C) (Temporal)   Ht 190.5 cm (75\")   Wt (!) 154 kg (339 lb)   SpO2 100%   BMI 42.37 kg/m²   Estimated body mass index is 42.37 kg/m² as calculated from the following:    Height as of this encounter: 190.5 cm (75\").    Weight as of this encounter: 154 kg (339 lb).               Physical Exam  Vitals and nursing note reviewed.   Constitutional:       General: He is not in acute distress.     Appearance: He is well-developed. He is not ill-appearing or diaphoretic.   HENT:      Head: Normocephalic and atraumatic.   Eyes:      " General:         Right eye: No discharge.         Left eye: No discharge.      Conjunctiva/sclera: Conjunctivae normal.   Cardiovascular:      Rate and Rhythm: Normal rate and regular rhythm.      Heart sounds: Normal heart sounds.   Pulmonary:      Effort: Pulmonary effort is normal.      Breath sounds: Normal breath sounds.   Abdominal:      General: Bowel sounds are normal. There is no distension.      Palpations: Abdomen is soft.      Tenderness: There is no abdominal tenderness.   Musculoskeletal:         General: No deformity.      Comments: Gait smooth and steady   Skin:     General: Skin is warm and dry.   Neurological:      Mental Status: He is alert and oriented to person, place, and time.   Psychiatric:         Mood and Affect: Mood normal.         Behavior: Behavior normal.          Physical Exam      Vital Signs  Blood pressure is elevated today.     Result Review :            Results  Laboratory Studies  Lipids were normal in 08/2024. Glucose was slightly elevated. Kidney function was normal. Liver function was normal.                Assessment and Plan     Diagnoses and all orders for this visit:    1. Essential hypertension (Primary)  Comments:  He will continue his current medication regimen.    2. Mixed hyperlipidemia  -     pravastatin (Pravachol) 20 MG tablet; Take 1 tablet by mouth Daily.  Dispense: 90 tablet; Refill: 1    3. DUDLEY on CPAP        Assessment & Plan  1. Hypertension.  His blood pressure readings have been consistently within the normal range during previous visits. However, today's reading was slightly elevated. He is currently on a low dose of amlodipine-benazepril (Lotrel), taken in the morning. He has gained a little weight over the last month, which could contribute to elevated blood pressure. He is advised to monitor his blood pressure at home, particularly towards the end of the day, and provide these readings for further evaluation. He is also advised to continue taking his  blood pressure medication in the morning. A prescription refill for his antihypertensive medication has been provided. If his blood pressure remains elevated, an adjustment in his medication dosage may be necessary.    2. Hyperlipidemia.  His lipid profile was last assessed in 08/2024, with results indicating optimal control of his cholesterol levels. He is advised to continue taking pravastatin either in the morning or at night, as per his preference. A prescription refill for pravastatin has been provided.    3. Health Maintenance.  He is currently using a CPAP machine for sleep apnea management. He has not yet completed the necessary paperwork for his colonoscopy due to personal circumstances but plans to do so with his wife's assistance. He is reminded to complete the paperwork for his colonoscopy before the next visit.    Follow-up  The patient will follow up in 6 months.            Follow Up     No follow-ups on file.  Patient was given instructions and counseling regarding his condition or for health maintenance advice. Please see specific information pulled into the AVS if appropriate.    Patient or patient representative verbalized consent for the use of Ambient Listening during the visit with  JULIO C Shetty for chart documentation. 1/28/2025  06:49 EST

## 2025-02-18 RX ORDER — AMLODIPINE AND BENAZEPRIL HYDROCHLORIDE 10; 20 MG/1; MG/1
1 CAPSULE ORAL DAILY
Qty: 30 CAPSULE | Refills: 2 | Status: SHIPPED | OUTPATIENT
Start: 2025-02-18

## 2025-03-12 ENCOUNTER — OFFICE VISIT (OUTPATIENT)
Dept: SLEEP MEDICINE | Facility: HOSPITAL | Age: 60
End: 2025-03-12
Payer: COMMERCIAL

## 2025-03-12 VITALS — OXYGEN SATURATION: 96 % | HEART RATE: 96 BPM | WEIGHT: 315 LBS | HEIGHT: 75 IN | BODY MASS INDEX: 39.17 KG/M2

## 2025-03-12 DIAGNOSIS — E66.813 CLASS 3 SEVERE OBESITY DUE TO EXCESS CALORIES WITH SERIOUS COMORBIDITY AND BODY MASS INDEX (BMI) OF 40.0 TO 44.9 IN ADULT: ICD-10-CM

## 2025-03-12 DIAGNOSIS — G47.33 OSA ON CPAP: Primary | ICD-10-CM

## 2025-03-12 DIAGNOSIS — G47.36 HYPOXEMIA ASSOCIATED WITH SLEEP: ICD-10-CM

## 2025-03-12 DIAGNOSIS — E66.01 CLASS 3 SEVERE OBESITY DUE TO EXCESS CALORIES WITH SERIOUS COMORBIDITY AND BODY MASS INDEX (BMI) OF 40.0 TO 44.9 IN ADULT: ICD-10-CM

## 2025-03-12 PROCEDURE — G0463 HOSPITAL OUTPT CLINIC VISIT: HCPCS

## 2025-03-12 PROCEDURE — 99213 OFFICE O/P EST LOW 20 MIN: CPT | Performed by: INTERNAL MEDICINE

## 2025-03-12 NOTE — PROGRESS NOTES
"[facility]  Follow Up Sleep Disorders Center Note     Chief Complaint:  DUDLEY     Primary Care Physician: Lisa Cunningham APRN    Interval History:   The patient is a 59 y.o. male who I last saw 10/22/2024 and that note was reviewed.  When last seen, auto CPAP changes made.  Patient reports he is doing well without new complaints.  He goes to bed at 9 PM and reads for 1 to 2 hours until he falls asleep.  He gets out of bed at 6 AM.  He does clock watch which we discussed not doing.    Review of Systems:    A complete review of systems was done and all were negative with the exception of the above    Social History:    Social History     Socioeconomic History    Marital status:      Spouse name: Angelique   Tobacco Use    Smoking status: Former     Current packs/day: 0.50     Average packs/day: 0.5 packs/day for 15.0 years (7.5 ttl pk-yrs)     Types: Cigars, Cigarettes    Smokeless tobacco: Never   Vaping Use    Vaping status: Never Used   Substance and Sexual Activity    Alcohol use: Yes     Alcohol/week: 6.0 standard drinks of alcohol     Types: 6 Cans of beer per week     Comment: occasionally    Drug use: Never    Sexual activity: Yes     Partners: Female     Birth control/protection: None       Allergies:  Patient has no known allergies.     Medication Review: His list was reviewed.      Vital Signs:    Vitals:    03/12/25 1024   Pulse: 96   SpO2: 96%   Weight: (!) 153 kg (337 lb)   Height: 190.5 cm (75\")     Body mass index is 42.12 kg/m².    Physical Exam:    Constitutional:  Well developed 59 y.o. male that appears in no apparent distress.  Awake & oriented times 3.  Normal mood with normal recent and remote memory and normal judgement.  Eyes:  Conjunctivae normal.  Oropharynx: Previously, moist mucous membranes without exudate and a large tongue and class III Mallampati airway.    Self-administered [place] Sleepiness Scale test results: 1, previously 1  0-5 Lower normal daytime " sleepiness  6-10 Higher normal daytime sleepiness  11-12 Mild, 13-15 Moderate, & 16-24 Severe excessive daytime sleepiness     Downloaded PAP Data Evaluated For Therapeutic Response and Compliance:  DME is [facility] and the patient uses a nasal mask.  Downloads between 12/11 and 3/10/2025 compliance 100%.  Average usage is 6 hours and 28 minutes.  Average AHI is mildly abnormal at 6.5 but all subsets are normal and he does have a leak.  Average auto CPAP pressure is 12.8 and his ResMed auto CPAP is 8-14    I have reviewed the above results and compared them with the patient's last downloads and reviewed with the patient.    Impression:   Severe obstructive sleep apnea with sleep-related hypoxia by home sleep study 5/1/2024, weight 332 pounds, adequately treated with ResMed auto CPAP.  Downloads demonstrate the patient to be at goal with good compliance and usage.  The patient has no significant complaints of hypersomnolence.     Plan:  Good sleep hygiene measures should be maintained.  Weight loss would be beneficial in this patient who has class III severe obesity by Body mass index is 42.12 kg/m².      After evaluating the patient and assessing results available, the patient is benefiting from the treatment being provided.     The patient will continue ResMed auto CPAP.  Potential side effects of not using PAP therapy reviewed and addressed as needed.  After clinical evaluation and review of downloads, I recommend no changes to the patient's pressures.  A new prescription will be sent to the patient's DME.    I answered all of the patient's questions.  The patient will call the Sleep Disorder Center for any problems and will follow up in 8-9 months.      Darvin San MD  Sleep Medicine  03/12/25  10:36 EDT

## 2025-05-14 RX ORDER — AMLODIPINE AND BENAZEPRIL HYDROCHLORIDE 10; 20 MG/1; MG/1
1 CAPSULE ORAL DAILY
Qty: 90 CAPSULE | Refills: 0 | Status: SHIPPED | OUTPATIENT
Start: 2025-05-14

## 2025-05-14 NOTE — TELEPHONE ENCOUNTER
Rx Refill Note  Requested Prescriptions     Pending Prescriptions Disp Refills    amLODIPine-benazepril (LOTREL) 10-20 MG per capsule [Pharmacy Med Name: amLODIPine-BENAZEPRIL 10-20 MG CAP] 90 capsule 1     Sig: TAKE 1 CAPSULE BY MOUTH DAILY      Last office visit with prescribing clinician: 1/14/2025   Last telemedicine visit with prescribing clinician: Visit date not found   Next office visit with prescribing clinician: Visit date not found                         Would you like a call back once the refill request has been completed: [] Yes [] No    If the office needs to give you a call back, can they leave a voicemail: [] Yes [] No    Rosario Funes Rep  05/14/25, 10:38 EDT

## 2025-05-14 NOTE — TELEPHONE ENCOUNTER
Please contact patient, medication was refilled, but needs an appointment for 6 month follow up end of June

## 2025-05-15 NOTE — TELEPHONE ENCOUNTER
Called Pt and Lvm to give a call back to schedule an appt for the end of June per Marisa. Hub to relay and schedule. Thank you!

## 2025-06-26 ENCOUNTER — OFFICE VISIT (OUTPATIENT)
Dept: FAMILY MEDICINE CLINIC | Facility: CLINIC | Age: 60
End: 2025-06-26
Payer: COMMERCIAL

## 2025-06-26 VITALS
WEIGHT: 315 LBS | RESPIRATION RATE: 18 BRPM | HEIGHT: 75 IN | SYSTOLIC BLOOD PRESSURE: 128 MMHG | DIASTOLIC BLOOD PRESSURE: 70 MMHG | TEMPERATURE: 97.5 F | OXYGEN SATURATION: 96 % | HEART RATE: 76 BPM | BODY MASS INDEX: 39.17 KG/M2

## 2025-06-26 DIAGNOSIS — G47.33 OSA ON CPAP: Chronic | ICD-10-CM

## 2025-06-26 DIAGNOSIS — I10 ESSENTIAL HYPERTENSION: Primary | Chronic | ICD-10-CM

## 2025-06-26 DIAGNOSIS — Z00.00 ROUTINE GENERAL MEDICAL EXAMINATION AT A HEALTH CARE FACILITY: ICD-10-CM

## 2025-06-26 DIAGNOSIS — M25.472 LEFT ANKLE SWELLING: ICD-10-CM

## 2025-06-26 DIAGNOSIS — Z86.718 HISTORY OF RECURRENT DEEP VEIN THROMBOSIS (DVT): ICD-10-CM

## 2025-06-26 DIAGNOSIS — E78.2 MIXED HYPERLIPIDEMIA: Chronic | ICD-10-CM

## 2025-06-26 NOTE — PROGRESS NOTES
"Chief Complaint  Essential hypertension    Subjective        Kamran Gongora presents to [facility]  History of Present Illness    History of Present Illness  Pt here for f/u on HTN, HLD.  Has been doing well.  No health concerns other than he had left ankle swelling with pain last week without apparent injury.  Lasted about 3 days and then resolved.  Many yrs ago he had sprained it and not sure if he did it again.      He is taking and tolerating antihypertensive and statin without side effects.  BP when checked at home similar to today.  Denies CP, HA, dizziness, dyspnea, cough.  No leg swelling.  No myalgia.      He has been off Eliquis since appr October.  Was not able to get it refilled and thought he was to stop it.  No s/s DVT since then.  Thought Dr San prescribing.  Not aware heme-onc recommendation was to take indefinitely.     DUDLEY on CPAP.  Compliant.  Does not notice significant improvement in sleep other than he does not wake up to urinate the way he did previously.        Objective   Vital Signs:  /70   Pulse 76   Temp 97.5 °F (36.4 °C) (Infrared)   Resp 18   Ht 190.5 cm (75\")   Wt (!) 153 kg (337 lb 14.4 oz)   SpO2 96%   BMI 42.23 kg/m²   Estimated body mass index is 42.23 kg/m² as calculated from the following:    Height as of this encounter: 190.5 cm (75\").    Weight as of this encounter: 153 kg (337 lb 14.4 oz).               Physical Exam  Vitals and nursing note reviewed.   Constitutional:       General: He is not in acute distress.     Appearance: He is well-developed. He is not ill-appearing or diaphoretic.   HENT:      Head: Normocephalic and atraumatic.      Ears:      Comments: Hearing loss  Eyes:      General:         Right eye: No discharge.         Left eye: No discharge.      Conjunctiva/sclera: Conjunctivae normal.   Cardiovascular:      Rate and Rhythm: Normal rate and regular rhythm.   Pulmonary:      Effort: Pulmonary effort is " normal.      Breath sounds: Normal breath sounds.   Abdominal:      General: Bowel sounds are normal. There is no distension.      Palpations: Abdomen is soft.      Tenderness: There is no abdominal tenderness.   Musculoskeletal:         General: No deformity.      Right lower leg: No edema.      Left lower leg: No edema (trace).      Comments: Gait smooth and steady   Skin:     General: Skin is warm and dry.   Neurological:      Mental Status: He is alert and oriented to person, place, and time.   Psychiatric:         Mood and Affect: Mood normal.         Behavior: Behavior normal.          Physical Exam       Result Review :            Results                  Assessment and Plan     Diagnoses and all orders for this visit:    1. Essential hypertension (Primary)    2. Mixed hyperlipidemia    3. DUDLEY on CPAP    4. History of recurrent deep vein thrombosis (DVT)    5. Left ankle swelling  -     Uric Acid; Future    6. Routine general medical examination at a health care facility  -     Comprehensive Metabolic Panel; Future  -     Hemoglobin A1c; Future  -     Lipid Panel With LDL / HDL Ratio; Future  -     Microalbumin / Creatinine Urine Ratio - Urine, Clean Catch; Future  -     Uric Acid; Future        Assessment & Plan     HTN:  BP at goal.  Continue current [id number].  Will get labs to monitor renal, e-lytes prior to CPE in August.      HLD:  on statin.  Continue for now and check lipids at upcoming CPE.      Ankle swelling-currently resolved, check uric acid    DUDLEY on CPAP: continue per sleep med-benefits discussed.     Recurrent DVT:  off Eliquis since October-ish.  I recommend he contact heme-onc to see if they want to restart it after this long off.  No recurrence off eliquis.  Frequent mvmt while at desk discussed.          Follow Up     No follow-ups on file.  Patient was given instructions and counseling regarding his condition or for health maintenance advice. Please see specific information pulled  into the AVS if appropriate.

## 2025-06-27 DIAGNOSIS — I82.402 ACUTE DEEP VEIN THROMBOSIS (DVT) OF LEFT LOWER EXTREMITY, UNSPECIFIED VEIN: ICD-10-CM

## 2025-06-27 NOTE — TELEPHONE ENCOUNTER
Caller: Angelique Gongora    Relationship: Emergency Contact    Best call back number: [phone]    Requested Prescriptions:   Requested Prescriptions     Pending Prescriptions Disp Refills    apixaban (Eliquis) 2.5 MG tablet tablet 60 tablet 11     Sig: Take 1 tablet by mouth.        Pharmacy where request should be sent: [facility] [id number] [place] [address] [address]  - [phone] [facility] [phone] FX     Last office visit with prescribing clinician: 10/10/2024   Last telemedicine visit with prescribing clinician: Visit date not found   Next office visit with prescribing clinician: Visit date not found     Does the patient have less than a 3 day supply:  [x] Yes  [] No    If the office needs to give you a call back, can they leave a voicemail: [x] Yes [] No

## 2025-07-11 DIAGNOSIS — E78.2 MIXED HYPERLIPIDEMIA: Chronic | ICD-10-CM

## 2025-07-11 NOTE — TELEPHONE ENCOUNTER
Rx Refill Note  Requested Prescriptions     Pending Prescriptions Disp Refills    pravastatin (PRAVACHOL) 20 MG tablet [Pharmacy Med Name: PRAVASTATIN SODIUM 20 MG TAB] 90 tablet 1     Sig: TAKE 1 TABLET BY MOUTH DAILY      Last office visit with prescribing clinician: 6/26/2025   Last telemedicine visit with prescribing clinician: Visit date not found   Next office visit with prescribing clinician: 8/12/2025                         Would you like a call back once the refill request has been completed: [] Yes [] No    If the office needs to give you a call back, can they leave a voicemail: [] Yes [] No    Rosario Funes Rep  07/11/25, 09:01 EDT

## 2025-07-14 RX ORDER — PRAVASTATIN SODIUM 20 MG
20 TABLET ORAL DAILY
Qty: 90 TABLET | Refills: 1 | Status: SHIPPED | OUTPATIENT
Start: 2025-07-14

## 2025-08-06 RX ORDER — AMLODIPINE AND BENAZEPRIL HYDROCHLORIDE 10; 20 MG/1; MG/1
1 CAPSULE ORAL DAILY
Qty: 90 CAPSULE | Refills: 1 | Status: SHIPPED | OUTPATIENT
Start: 2025-08-06

## 2025-08-12 ENCOUNTER — OFFICE VISIT (OUTPATIENT)
Dept: FAMILY MEDICINE CLINIC | Facility: CLINIC | Age: 60
End: 2025-08-12
Payer: COMMERCIAL

## 2025-08-12 VITALS
TEMPERATURE: 97.5 F | BODY MASS INDEX: 39.17 KG/M2 | OXYGEN SATURATION: 97 % | SYSTOLIC BLOOD PRESSURE: 131 MMHG | WEIGHT: 315 LBS | HEART RATE: 68 BPM | DIASTOLIC BLOOD PRESSURE: 84 MMHG | RESPIRATION RATE: 17 BRPM | HEIGHT: 75 IN

## 2025-08-12 DIAGNOSIS — Z12.11 SCREEN FOR COLON CANCER: ICD-10-CM

## 2025-08-12 DIAGNOSIS — Z12.5 SCREENING FOR PROSTATE CANCER: ICD-10-CM

## 2025-08-12 DIAGNOSIS — Z00.00 ROUTINE GENERAL MEDICAL EXAMINATION AT A HEALTH CARE FACILITY: Primary | ICD-10-CM

## 2025-08-13 LAB
ALBUMIN SERPL-MCNC: 4.3 G/DL (ref 3.5–5.2)
ALBUMIN/GLOB SERPL: 1.4 G/DL
ALP SERPL-CCNC: 118 U/L (ref 39–117)
ALT SERPL-CCNC: 16 U/L (ref 1–41)
APPEARANCE UR: CLEAR
AST SERPL-CCNC: 18 U/L (ref 1–40)
BACTERIA #/AREA URNS HPF: NORMAL /HPF
BILIRUB SERPL-MCNC: 0.5 MG/DL (ref 0–1.2)
BILIRUB UR QL STRIP: NEGATIVE
BUN SERPL-MCNC: 19 MG/DL (ref 6–20)
BUN/CREAT SERPL: 17.9 (ref 7–25)
CALCIUM SERPL-MCNC: 9.9 MG/DL (ref 8.6–10.5)
CASTS URNS MICRO: NORMAL
CHLORIDE SERPL-SCNC: 104 MMOL/L (ref 98–107)
CHOLEST SERPL-MCNC: 182 MG/DL (ref 0–200)
CO2 SERPL-SCNC: 25.3 MMOL/L (ref 22–29)
COLOR UR: YELLOW
CREAT SERPL-MCNC: 1.06 MG/DL (ref 0.76–1.27)
EGFRCR SERPLBLD CKD-EPI 2021: 80.8 ML/MIN/1.73
EPI CELLS #/AREA URNS HPF: NORMAL /HPF
ERYTHROCYTE [DISTWIDTH] IN BLOOD BY AUTOMATED COUNT: 13.2 % (ref 12.3–15.4)
GLOBULIN SER CALC-MCNC: 3 GM/DL
GLUCOSE SERPL-MCNC: 101 MG/DL (ref 65–99)
GLUCOSE UR QL STRIP: NEGATIVE
HBA1C MFR BLD: 5.9 % (ref 4.8–5.6)
HCT VFR BLD AUTO: 49.7 % (ref 37.5–51)
HDLC SERPL-MCNC: 47 MG/DL (ref 40–60)
HGB BLD-MCNC: 16.3 G/DL (ref 13–17.7)
HGB UR QL STRIP: NEGATIVE
KETONES UR QL STRIP: NEGATIVE
LDLC SERPL CALC-MCNC: 115 MG/DL (ref 0–100)
LDLC/HDLC SERPL: 2.39 {RATIO}
LEUKOCYTE ESTERASE UR QL STRIP: NEGATIVE
MCH RBC QN AUTO: 31 PG (ref 26.6–33)
MCHC RBC AUTO-ENTMCNC: 32.8 G/DL (ref 31.5–35.7)
MCV RBC AUTO: 94.5 FL (ref 79–97)
NITRITE UR QL STRIP: NEGATIVE
PH UR STRIP: 6.5 [PH] (ref 5–8)
PLATELET # BLD AUTO: 354 10*3/MM3 (ref 140–450)
POTASSIUM SERPL-SCNC: 5.1 MMOL/L (ref 3.5–5.2)
PROT SERPL-MCNC: 7.3 G/DL (ref 6–8.5)
PROT UR QL STRIP: ABNORMAL
PSA SERPL-MCNC: 2.99 NG/ML (ref 0–4)
RBC # BLD AUTO: 5.26 10*6/MM3 (ref 4.14–5.8)
RBC #/AREA URNS HPF: NORMAL /HPF
SODIUM SERPL-SCNC: 139 MMOL/L (ref 136–145)
SP GR UR STRIP: 1.02 (ref 1–1.03)
TRIGL SERPL-MCNC: 113 MG/DL (ref 0–150)
TSH SERPL DL<=0.005 MIU/L-ACNC: 2.35 UIU/ML (ref 0.27–4.2)
UROBILINOGEN UR STRIP-MCNC: ABNORMAL MG/DL
VLDLC SERPL CALC-MCNC: 20 MG/DL (ref 5–40)
WBC # BLD AUTO: 8.59 10*3/MM3 (ref 3.4–10.8)
WBC #/AREA URNS HPF: NORMAL /HPF

## 2025-08-27 ENCOUNTER — TELEPHONE (OUTPATIENT)
Dept: FAMILY MEDICINE CLINIC | Facility: CLINIC | Age: 60
End: 2025-08-27
Payer: COMMERCIAL